# Patient Record
Sex: MALE | Race: BLACK OR AFRICAN AMERICAN | URBAN - METROPOLITAN AREA
[De-identification: names, ages, dates, MRNs, and addresses within clinical notes are randomized per-mention and may not be internally consistent; named-entity substitution may affect disease eponyms.]

---

## 2024-01-27 ENCOUNTER — HOSPITAL ENCOUNTER (EMERGENCY)
Facility: HOSPITAL | Age: 29
Discharge: HOME | End: 2024-01-27
Attending: EMERGENCY MEDICINE
Payer: MEDICAID

## 2024-01-27 VITALS
SYSTOLIC BLOOD PRESSURE: 129 MMHG | TEMPERATURE: 98 F | OXYGEN SATURATION: 96 % | HEART RATE: 102 BPM | WEIGHT: 198 LBS | DIASTOLIC BLOOD PRESSURE: 79 MMHG | BODY MASS INDEX: 25.41 KG/M2 | HEIGHT: 74 IN | RESPIRATION RATE: 18 BRPM

## 2024-01-27 DIAGNOSIS — K08.89 PAIN, DENTAL: Primary | ICD-10-CM

## 2024-01-27 LAB — GLUCOSE BLD-MCNC: 76 MG/DL (ref 70–99)

## 2024-01-27 PROCEDURE — 99283 EMERGENCY DEPT VISIT LOW MDM: CPT

## 2024-01-27 PROCEDURE — 82962 GLUCOSE BLOOD TEST: CPT

## 2024-01-27 RX ORDER — AMOXICILLIN 875 MG/1
875 TABLET, COATED ORAL ONCE
Status: COMPLETED | OUTPATIENT
Start: 2024-01-27 | End: 2024-01-27

## 2024-01-27 RX ORDER — AMOXICILLIN 875 MG/1
875 TABLET, COATED ORAL 2 TIMES DAILY
Qty: 20 TABLET | Refills: 0 | Status: SHIPPED | OUTPATIENT
Start: 2024-01-27 | End: 2024-02-06

## 2024-01-27 RX ORDER — QUETIAPINE FUMARATE 200 MG/1
200 TABLET, FILM COATED ORAL NIGHTLY
COMMUNITY

## 2024-01-27 RX ORDER — IBUPROFEN 200 MG
400 TABLET ORAL ONCE
Status: COMPLETED | OUTPATIENT
Start: 2024-01-27 | End: 2024-01-27

## 2024-01-27 RX ORDER — TRAZODONE HYDROCHLORIDE 100 MG/1
100 TABLET ORAL NIGHTLY
COMMUNITY

## 2024-01-27 RX ORDER — HYDROCODONE BITARTRATE AND ACETAMINOPHEN 5; 325 MG/1; MG/1
TABLET ORAL
Status: DISCONTINUED
Start: 2024-01-27 | End: 2024-01-27

## 2024-01-27 RX ORDER — HYDROCODONE BITARTRATE AND ACETAMINOPHEN 5; 325 MG/1; MG/1
2 TABLET ORAL ONCE
Status: COMPLETED | OUTPATIENT
Start: 2024-01-27 | End: 2024-01-27

## 2024-01-27 RX ORDER — BUSPIRONE HYDROCHLORIDE 5 MG/1
5 TABLET ORAL 2 TIMES DAILY
COMMUNITY

## 2024-01-27 RX ORDER — HYDROCODONE BITARTRATE AND ACETAMINOPHEN 5; 325 MG/1; MG/1
1-2 TABLET ORAL EVERY 6 HOURS PRN
Qty: 10 TABLET | Refills: 0 | Status: SHIPPED | OUTPATIENT
Start: 2024-01-27 | End: 2024-02-01

## 2024-01-27 RX ORDER — LISINOPRIL 10 MG/1
10 TABLET ORAL DAILY
COMMUNITY

## 2024-01-27 NOTE — DISCHARGE INSTRUCTIONS
Avoid foods and liquids that are excessively hot or cold.    You may use ibuprofen 400 mg 2-3 times a day for pain.    Hydrocodone may cause drowsiness and constipation.    Follow-up with your dentist as previously arranged on February 1.

## 2024-01-27 NOTE — ED PROVIDER NOTES
Patient Seen in: [facility] Emergency Department      History     Chief Complaint   Patient presents with    Dental Problem     Stated Complaint: \"I have a really big hole in my tooth, and I have a dentist appt on Tuesday\"    Subjective:   HPI    28-year-old male presents to the emergency department complaining of left upper tooth pain.  The patient stated he chipped his tooth a couple of days ago.  He has an appointment to see his dentist in 3 days but developed pain to the area overnight.  Some soft tissue swelling.  He is a type 1 diabetic.  Blood sugars have been in the 100-200 range.  He is unaware of any renal dysfunction.    Objective:   Past Medical History:   Diagnosis Date    Essential hypertension     Hyperlipidemia     Type 1 diabetes mellitus (HCC)               History reviewed. No pertinent surgical history.             Social History     Socioeconomic History    Marital status: Single   Tobacco Use    Smoking status: Former     Types: Cigarettes     Quit date: 2023     Years since quittin.0    Smokeless tobacco: Never   Vaping Use    Vaping Use: Never used   Substance and Sexual Activity    Alcohol use: Yes     Comment: socially    Drug use: Yes     Types: Cannabis              Review of Systems    Positive for stated complaint: \"I have a really big hole in my tooth, and I have a dentist appt on Tuesday\"  Other systems are as noted in HPI.  Constitutional and vital signs reviewed.      All other systems reviewed and negative except as noted above.    Physical Exam     ED Triage Vitals   BP 24 1443 129/79   Pulse 24 1443 102   Resp 24 1443 18   Temp 24 1442 97.9 °F (36.6 °C)   Temp src 24 1442 Temporal   SpO2 24 1442 96 %   O2 Device 24 1442 None (Room air)       Current:/79   Pulse 102   Temp 97.9 °F (36.6 °C) (Temporal)   Resp 18   Ht 188 cm (6' 2\")   Wt 89.8 kg   SpO2 96%   BMI 25.42 kg/m²         Physical Exam    General  appearance: This is a young adult male sitting on a gurney.  Vital signs were reviewed per nurses notes.  Patient is afebrile.  HEENT: Normocephalic atraumatic.  Anicteric sclera.  Oral mucosa is moist.  Oropharynx is normal.  Left upper first molar has a small fracture noted on the posterior aspect.  There is also a partially erupted left upper third molar.  No gingival fluctuance.  No sublingual or submandibular soft tissue swelling.    ED Course     Labs Reviewed   POCT GLUCOSE - Normal             Blood sugar was normal.    Exam findings and treatment plan were discussed.  Initial doses of amoxicillin and Norco were administered in the emergency department.         MDM      #1.  Dental fracture.  No obvious abscess.  Temperature restrictions discussed with food and fluids.  Antibiotics and analgesics provided.  Patient has dental follow-up in 3 days.                                   MDM    Disposition and Plan     Clinical Impression:  1. Pain, dental         Disposition:  Discharge  1/27/2024  3:28 pm    Follow-up:  Daniel Braswell MD  [address] [facility] [address]  [phone]    Call  As needed          Medications Prescribed:  Discharge Medication List as of 1/27/2024  4:05 PM        START taking these medications    Details   amoxicillin 875 MG Oral Tab Take 1 tablet (875 mg total) by mouth 2 (two) times daily for 10 days., Normal, Disp-20 tablet, R-0      HYDROcodone-acetaminophen 5-325 MG Oral Tab Take 1-2 tablets by mouth every 6 (six) hours as needed for Pain., Normal, Disp-10 tablet, R-0

## 2024-01-27 NOTE — ED INITIAL ASSESSMENT (HPI)
Pt ambulatory to ED w/ c/o \"huge hole to upper tooth on R side\" +swelling to gums. Dentist apt scheduled for Tuesday. Reports 10/10 pain. Denies fevers.

## 2024-05-23 ENCOUNTER — HOSPITAL ENCOUNTER (EMERGENCY)
Facility: HOSPITAL | Age: 29
Discharge: HOME | End: 2024-05-23
Attending: EMERGENCY MEDICINE

## 2024-05-23 ENCOUNTER — APPOINTMENT (OUTPATIENT)
Dept: GENERAL RADIOLOGY | Facility: HOSPITAL | Age: 29
End: 2024-05-23
Attending: EMERGENCY MEDICINE

## 2024-05-23 VITALS
HEART RATE: 86 BPM | TEMPERATURE: 97 F | DIASTOLIC BLOOD PRESSURE: 86 MMHG | RESPIRATION RATE: 18 BRPM | SYSTOLIC BLOOD PRESSURE: 116 MMHG | OXYGEN SATURATION: 98 %

## 2024-05-23 DIAGNOSIS — M25.562 LEFT KNEE PAIN, UNSPECIFIED CHRONICITY: ICD-10-CM

## 2024-05-23 DIAGNOSIS — Z00.8 ENCOUNTER FOR PSYCHOLOGICAL EVALUATION: Primary | ICD-10-CM

## 2024-05-23 LAB
ALBUMIN SERPL-MCNC: 3.7 G/DL (ref 3.4–5)
ALBUMIN/GLOB SERPL: 1.1 {RATIO} (ref 1–2)
ALP LIVER SERPL-CCNC: 81 U/L
ALT SERPL-CCNC: 37 U/L
AMPHET UR QL SCN: NEGATIVE
ANION GAP SERPL CALC-SCNC: 7 MMOL/L (ref 0–18)
APAP SERPL-MCNC: <2 UG/ML (ref 10–30)
AST SERPL-CCNC: 21 U/L (ref 15–37)
BASOPHILS # BLD AUTO: 0.04 X10(3) UL (ref 0–0.2)
BASOPHILS NFR BLD AUTO: 0.4 %
BENZODIAZ UR QL SCN: NEGATIVE
BILIRUB SERPL-MCNC: 0.6 MG/DL (ref 0.1–2)
BUN BLD-MCNC: 11 MG/DL (ref 9–23)
CALCIUM BLD-MCNC: 8.7 MG/DL (ref 8.5–10.1)
CHLORIDE SERPL-SCNC: 106 MMOL/L (ref 98–112)
CO2 SERPL-SCNC: 25 MMOL/L (ref 21–32)
COCAINE UR QL: NEGATIVE
CREAT BLD-MCNC: 1.02 MG/DL
CREAT UR-SCNC: 496 MG/DL
EGFRCR SERPLBLD CKD-EPI 2021: 104 ML/MIN/1.73M2 (ref 60–?)
EOSINOPHIL # BLD AUTO: 0.08 X10(3) UL (ref 0–0.7)
EOSINOPHIL NFR BLD AUTO: 0.8 %
ERYTHROCYTE [DISTWIDTH] IN BLOOD BY AUTOMATED COUNT: 11.7 %
ETHANOL SERPL-MCNC: <3 MG/DL (ref ?–3)
GLOBULIN PLAS-MCNC: 3.5 G/DL (ref 2.8–4.4)
GLUCOSE BLD-MCNC: 104 MG/DL (ref 70–99)
GLUCOSE BLD-MCNC: 245 MG/DL (ref 70–99)
GLUCOSE BLD-MCNC: 315 MG/DL (ref 70–99)
GLUCOSE BLD-MCNC: 448 MG/DL (ref 70–99)
HCT VFR BLD AUTO: 50 %
HGB BLD-MCNC: 17.9 G/DL
IMM GRANULOCYTES # BLD AUTO: 0.02 X10(3) UL (ref 0–1)
IMM GRANULOCYTES NFR BLD: 0.2 %
LYMPHOCYTES # BLD AUTO: 2.81 X10(3) UL (ref 1–4)
LYMPHOCYTES NFR BLD AUTO: 27.8 %
MCH RBC QN AUTO: 31.2 PG (ref 26–34)
MCHC RBC AUTO-ENTMCNC: 35.8 G/DL (ref 31–37)
MCV RBC AUTO: 87.1 FL
MDMA UR QL SCN: NEGATIVE
MONOCYTES # BLD AUTO: 0.86 X10(3) UL (ref 0.1–1)
MONOCYTES NFR BLD AUTO: 8.5 %
NEUTROPHILS # BLD AUTO: 6.28 X10 (3) UL (ref 1.5–7.7)
NEUTROPHILS # BLD AUTO: 6.28 X10(3) UL (ref 1.5–7.7)
NEUTROPHILS NFR BLD AUTO: 62.3 %
OPIATES UR QL SCN: NEGATIVE
OSMOLALITY SERPL CALC.SUM OF ELEC: 294 MOSM/KG (ref 275–295)
OXYCODONE UR QL SCN: NEGATIVE
PLATELET # BLD AUTO: 264 10(3)UL (ref 150–450)
POTASSIUM SERPL-SCNC: 3.5 MMOL/L (ref 3.5–5.1)
PROT SERPL-MCNC: 7.2 G/DL (ref 6.4–8.2)
RBC # BLD AUTO: 5.74 X10(6)UL
SALICYLATES SERPL-MCNC: 2.5 MG/DL (ref 2.8–20)
SARS-COV-2 RNA RESP QL NAA+PROBE: NOT DETECTED
SODIUM SERPL-SCNC: 138 MMOL/L (ref 136–145)
WBC # BLD AUTO: 10.1 X10(3) UL (ref 4–11)

## 2024-05-23 PROCEDURE — 85025 COMPLETE CBC W/AUTO DIFF WBC: CPT | Performed by: EMERGENCY MEDICINE

## 2024-05-23 PROCEDURE — 99285 EMERGENCY DEPT VISIT HI MDM: CPT

## 2024-05-23 PROCEDURE — 80143 DRUG ASSAY ACETAMINOPHEN: CPT | Performed by: EMERGENCY MEDICINE

## 2024-05-23 PROCEDURE — 36415 COLL VENOUS BLD VENIPUNCTURE: CPT

## 2024-05-23 PROCEDURE — 73562 X-RAY EXAM OF KNEE 3: CPT | Performed by: EMERGENCY MEDICINE

## 2024-05-23 PROCEDURE — 80053 COMPREHEN METABOLIC PANEL: CPT | Performed by: EMERGENCY MEDICINE

## 2024-05-23 PROCEDURE — 80307 DRUG TEST PRSMV CHEM ANLYZR: CPT | Performed by: EMERGENCY MEDICINE

## 2024-05-23 PROCEDURE — 73110 X-RAY EXAM OF WRIST: CPT | Performed by: EMERGENCY MEDICINE

## 2024-05-23 PROCEDURE — 80179 DRUG ASSAY SALICYLATE: CPT | Performed by: EMERGENCY MEDICINE

## 2024-05-23 PROCEDURE — 71101 X-RAY EXAM UNILAT RIBS/CHEST: CPT | Performed by: EMERGENCY MEDICINE

## 2024-05-23 PROCEDURE — 82077 ASSAY SPEC XCP UR&BREATH IA: CPT | Performed by: EMERGENCY MEDICINE

## 2024-05-23 PROCEDURE — 82962 GLUCOSE BLOOD TEST: CPT

## 2024-05-23 PROCEDURE — 99284 EMERGENCY DEPT VISIT MOD MDM: CPT

## 2024-05-23 RX ORDER — ATORVASTATIN CALCIUM 20 MG/1
20 TABLET, FILM COATED ORAL DAILY
COMMUNITY
Start: 2024-04-11

## 2024-05-23 RX ORDER — NICOTINE POLACRILEX 4 MG
30 LOZENGE BUCCAL
Status: DISCONTINUED | OUTPATIENT
Start: 2024-05-23 | End: 2024-05-23

## 2024-05-23 RX ORDER — GABAPENTIN 300 MG/1
300 CAPSULE ORAL 3 TIMES DAILY
COMMUNITY

## 2024-05-23 RX ORDER — INSULIN DEGLUDEC 200 U/ML
100 INJECTION, SOLUTION SUBCUTANEOUS DAILY
Status: DISCONTINUED | OUTPATIENT
Start: 2024-05-23 | End: 2024-05-23

## 2024-05-23 RX ORDER — NICOTINE POLACRILEX 4 MG
15 LOZENGE BUCCAL
Status: DISCONTINUED | OUTPATIENT
Start: 2024-05-23 | End: 2024-05-23

## 2024-05-23 RX ORDER — BUSPIRONE HYDROCHLORIDE 10 MG/1
10 TABLET ORAL 2 TIMES DAILY
COMMUNITY
Start: 2024-04-18

## 2024-05-23 RX ORDER — DEXTROSE MONOHYDRATE 25 G/50ML
50 INJECTION, SOLUTION INTRAVENOUS
Status: DISCONTINUED | OUTPATIENT
Start: 2024-05-23 | End: 2024-05-23

## 2024-05-23 RX ORDER — INSULIN LISPRO 200 [IU]/ML
25 INJECTION, SOLUTION SUBCUTANEOUS 3 TIMES DAILY
COMMUNITY

## 2024-05-23 RX ORDER — INSULIN ASPART 100 [IU]/ML
0.2 INJECTION, SOLUTION INTRAVENOUS; SUBCUTANEOUS ONCE
Status: COMPLETED | OUTPATIENT
Start: 2024-05-23 | End: 2024-05-23

## 2024-05-23 NOTE — ED QUICK NOTES
PT calm, cooperative, properly interacting with staff. PT did not present any aggressive or other concerning type of behavior so far. Safety Sitter present

## 2024-05-23 NOTE — ED QUICK NOTES
Blood Glucose taken prior to PT eating breakfast. Results of 315 informed to Dr. Scott, orders will be placed for long lasting insulin ( as requested by PT due to medications taken a t home). Long lasting insulin to be administered and blood glucose to be repeated before lunch to confirm if more short acting insulin is necessary.

## 2024-05-23 NOTE — ED PROVIDER NOTES
Patient Seen in: [facility] Emergency Department      History     Chief Complaint   Patient presents with    Eval-P     Stated Complaint: Eval P    Subjective:   HPI    Patient states he is here for mental health evaluation.  He has a history of depression, anxiety, bipolar disorder, states he has not had access his meds in 2 weeks.    He feels helpless and hopeless.  Denies any SI HI or psychotic features.    When asked if he would feel okay going home with just a prescription for his meds to  he says \"I know myself\" and says he needs to be evaluated for potential patient psychiatric admission.  He denies any medical complaints.     Objective:   Past Medical History:    Anxiety    Depression    Essential hypertension    Hyperlipidemia    Type 1 diabetes mellitus (HCC)              History reviewed. No pertinent surgical history.             Social History     Socioeconomic History    Marital status: Single   Tobacco Use    Smoking status: Former     Current packs/day: 0.00     Types: Cigarettes     Quit date: 2023     Years since quittin.4    Smokeless tobacco: Never   Vaping Use    Vaping status: Never Used   Substance and Sexual Activity    Alcohol use: Yes     Comment: socially    Drug use: Yes     Types: Cannabis              Review of Systems    Positive for stated complaint: Eval P  Other systems are as noted in HPI.  Constitutional and vital signs reviewed.      All other systems reviewed and negative except as noted above.    Physical Exam     ED Triage Vitals [24 0251]   /88   Pulse 88   Resp 18   Temp 97.1 °F (36.2 °C)   Temp src Temporal   SpO2 98 %   O2 Device None (Room air)       Current Vitals:   Vital Signs  BP: 116/86  Pulse: 86  Resp: 18  Temp: 97.1 °F (36.2 °C)  Temp src: Temporal    Oxygen Therapy  SpO2: 98 %  O2 Device: None (Room air)            Physical Exam    Physical Exam   Constitutional: Awake, alert, well appearing  Head: Normocephalic and atraumatic.    Eyes: Conjunctivae are normal. Pupils are equal, round, and reactive to light.   Neck: Normal range of motion. Neck supple.   Cardiovascular: Normal rate, regular rhythm  Pulmonary/Chest: Normal effort.  No accessory muscle use.  No clubbing, no cyanosis.  Abdominal: Soft. Bowel sounds are normal.   Neurological: Pt is alert and oriented to person, place, and time. no cranial nerve deficits  Skin: Skin is warm and dry.    ED Course     Labs Reviewed   COMP METABOLIC PANEL (14) - Abnormal; Notable for the following components:       Result Value    Glucose 245 (*)     All other components within normal limits   ACETAMINOPHEN (TYLENOL), S - Abnormal; Notable for the following components:    Acetaminophen <2.0 (*)     All other components within normal limits   SALICYLATE, SERUM - Abnormal; Notable for the following components:    Salicylate 2.5 (*)     All other components within normal limits   DRUG SCREEN 8 W/OUT CONFIRMATION, URINE - Abnormal; Notable for the following components:    Cannabinoid Urine Presumed Positive (*)     All other components within normal limits    Narrative:     Results of the Urine Drug Screen should be used only for medical purposes.   POCT GLUCOSE - Abnormal; Notable for the following components:    POC Glucose 448 (*)     All other components within normal limits   POCT GLUCOSE - Abnormal; Notable for the following components:    POC Glucose 315 (*)     All other components within normal limits   POCT GLUCOSE - Abnormal; Notable for the following components:    POC Glucose 104 (*)     All other components within normal limits   CBC W/ DIFFERENTIAL - Abnormal; Notable for the following components:    RBC 5.74 (*)     HGB 17.9 (*)     All other components within normal limits   ETHYL ALCOHOL - Normal   SARS-COV-2 BY PCR (GENEXPERT) - Normal   CBC WITH DIFFERENTIAL WITH PLATELET    Narrative:     The following orders were created for panel order CBC With Differential With  Platelet.  Procedure                               Abnormality         Status                     ---------                               -----------         ------                     CBC W/ DIFFERENTIAL[[id number]]          Abnormal            Final result                 Please view results for these tests on the individual orders.               Blood reviewed, mild hyperglycemia but otherwise labs are okay.  Accu-Cheks ordered.         MDM          Differential diagnoses considered:, Bipolar disorder, suicidal ideation,  acute mental health decompensation need for inpatient psychiatric admission.    -Patient is medically cleared for potential inpatient psychiatric admission  -Nabeel Allen evaluate for this later this morning.  Signed out to oncoming doctor          *Discussion of ongoing management of this patient's care included:  Nabeel Allen liaison regarding need for evaluation  *Comorbidities contributing to the complexity of decision making: n/a  *External charts reviewed: n/a  *Additional sources of history: n/a    Shared decision making was done by: patient, myself.                                     Medical Decision Making      Disposition and Plan     Clinical Impression:  1. Encounter for psychological evaluation    2. Left knee pain, unspecified chronicity         Disposition:  Discharge  5/23/2024 11:44 am    Follow-up:  Darren Meek MD  [address]  [address] [address]  [phone]    Call in 1 day(s)            Medications Prescribed:  Discharge Medication List as of 5/23/2024 11:45 AM

## 2024-05-23 NOTE — CM/SW NOTE
Patient cleared for discharge and is requesting transportation home.    [facility] spoke with Angeles @ [facility], [phone]. Lalo Dunn will be sending a taxi to pick patient up.  Angeles did not have a time frame.  The taxi will call the patient and/or the nursing station for approximate ETA.    Patient discharged and waiting in the lobby.  Transportation information given to patient.  Patient verbalized understanding.
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
## 2024-05-23 NOTE — ED QUICK NOTES
Pt ate sandwich and some chips earlier in the morning. Pt reports he is aware of all his daily medications and his insulin.    Masoud at bedside in order to update medication list.     Pt given ice water per request.

## 2024-05-23 NOTE — ED INITIAL ASSESSMENT (HPI)
Pt arrives via EMS [facility], pt states he is having SI thoughts, no real plan. Has not been taking his medications for anxiety for several weeks. Calm and cooperative. A&O x4

## 2024-05-23 NOTE — ED QUICK NOTES
This nurse was informed by pharmacy staff that the hospital does not carry Levemir ( PT's home medication) and pharmacy staff stated that the equivalent is Insulin Degludec ( To be administered DAILY/Once a day instead of Levemir BID). Dr. Scott informed and he confirms that medication orders can be modified to effectively manage the PT's diabetes

## 2024-05-23 NOTE — DISCHARGE INSTRUCTIONS
PHP/IOP resources:     [facility]- [phone]  [address] [place] [address]    [facility]-  [phone]- IOP Only  [address], [place] [address]    [facility] - [phone]  [address] [place] [address]    Psychiatry resources:     Renée [facility]orton- Online and In person   Psychiatric Nurse Practitioner[place] [phone]    [facility] [phone]    [facility] [phone]    [facility]- Online Only   [place], [place] [phone]

## 2024-05-23 NOTE — ED QUICK NOTES
Report given to BETY Burnett.    Lex aware that pt needs med reconciliation completed and meds ordered.

## 2024-05-23 NOTE — ED QUICK NOTES
Dr. Scott informed about the PT's glucose level, he will place orders for PT's medications after pharmacy finishes the reconciliation process.
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
## 2024-05-23 NOTE — ED QUICK NOTES
Pt awake and alert, skin w/d,resps appear unlabored. Pt ambulatory to restroom with steady gait. Sitter at bedside.

## 2025-04-11 ENCOUNTER — HOSPITAL ENCOUNTER (EMERGENCY)
Facility: HOSPITAL | Age: 30
Discharge: HOME | End: 2025-04-11
Attending: STUDENT IN AN ORGANIZED HEALTH CARE EDUCATION/TRAINING PROGRAM
Payer: MEDICAID

## 2025-04-11 ENCOUNTER — APPOINTMENT (OUTPATIENT)
Dept: GENERAL RADIOLOGY | Facility: HOSPITAL | Age: 30
End: 2025-04-11
Payer: MEDICAID

## 2025-04-11 VITALS
HEART RATE: 105 BPM | WEIGHT: 197 LBS | OXYGEN SATURATION: 100 % | RESPIRATION RATE: 19 BRPM | SYSTOLIC BLOOD PRESSURE: 126 MMHG | HEIGHT: 73 IN | TEMPERATURE: 99 F | BODY MASS INDEX: 26.11 KG/M2 | DIASTOLIC BLOOD PRESSURE: 97 MMHG

## 2025-04-11 DIAGNOSIS — J11.1 INFLUENZA: Primary | ICD-10-CM

## 2025-04-11 LAB
FLUAV + FLUBV RNA SPEC NAA+PROBE: NEGATIVE
FLUAV + FLUBV RNA SPEC NAA+PROBE: POSITIVE
RSV RNA SPEC NAA+PROBE: NEGATIVE
SARS-COV-2 RNA RESP QL NAA+PROBE: NOT DETECTED

## 2025-04-11 PROCEDURE — 99284 EMERGENCY DEPT VISIT MOD MDM: CPT

## 2025-04-11 PROCEDURE — 0241U SARS-COV-2/FLU A AND B/RSV BY PCR (GENEXPERT): CPT | Performed by: STUDENT IN AN ORGANIZED HEALTH CARE EDUCATION/TRAINING PROGRAM

## 2025-04-11 PROCEDURE — 94640 AIRWAY INHALATION TREATMENT: CPT

## 2025-04-11 PROCEDURE — 71046 X-RAY EXAM CHEST 2 VIEWS: CPT | Performed by: STUDENT IN AN ORGANIZED HEALTH CARE EDUCATION/TRAINING PROGRAM

## 2025-04-11 RX ORDER — IBUPROFEN 600 MG/1
TABLET, FILM COATED ORAL
Status: COMPLETED
Start: 2025-04-11 | End: 2025-04-11

## 2025-04-11 RX ORDER — IBUPROFEN 600 MG/1
600 TABLET, FILM COATED ORAL EVERY 8 HOURS PRN
Qty: 30 TABLET | Refills: 0 | Status: SHIPPED | OUTPATIENT
Start: 2025-04-11 | End: 2025-04-18

## 2025-04-11 RX ORDER — ACETAMINOPHEN 500 MG
1000 TABLET ORAL ONCE
Status: COMPLETED | OUTPATIENT
Start: 2025-04-11 | End: 2025-04-11

## 2025-04-11 RX ORDER — BENZONATATE 100 MG/1
100 CAPSULE ORAL 3 TIMES DAILY PRN
Qty: 30 CAPSULE | Refills: 0 | Status: SHIPPED | OUTPATIENT
Start: 2025-04-11 | End: 2025-05-11

## 2025-04-11 RX ORDER — ALBUTEROL SULFATE 90 UG/1
2 INHALANT RESPIRATORY (INHALATION) ONCE
Status: COMPLETED | OUTPATIENT
Start: 2025-04-11 | End: 2025-04-11

## 2025-04-11 RX ORDER — ACETAMINOPHEN 500 MG
TABLET ORAL
Status: DISCONTINUED
Start: 2025-04-11 | End: 2025-04-11

## 2025-04-11 RX ORDER — IBUPROFEN 600 MG/1
600 TABLET, FILM COATED ORAL ONCE
Status: COMPLETED | OUTPATIENT
Start: 2025-04-11 | End: 2025-04-11

## 2025-04-11 NOTE — ED QUICK NOTES
Patient is AOX4, multitude of questions were answered within this writer's scope of practice, patient endorses taking several otc medications to manage symptoms without relief

## 2025-04-11 NOTE — DISCHARGE INSTRUCTIONS
I suggest you buy a Kay pot from the pharmacy and irrigate your sinuses in the evening, pat dry then administer 2 puffs of Flonase. Do this for the next 1 week.

## 2025-04-11 NOTE — ED INITIAL ASSESSMENT (HPI)
Pt here due to a cough that has been going on for the past five days. Pt states that his son was sick first and now him and his wife are sick. Pt's wife is also being seen in the ER at this time for the same thing. Pt states that he is holding in the cough because it hurts to much to cough. When he does cough he states that the pain is in the upper part of his chest in the middle.

## 2025-04-11 NOTE — ED PROVIDER NOTES
History     Chief Complaint   Patient presents with    Cough/URI       HPI    29 year old male here with about 5 days of cough, congestion, rhinorrhea, sore throat.  Reports patient's son was initially sick and now him and his wife of the same symptoms results with patient in the ER.  Occasional smoker, no prior history of asthma.  No fevers or vomiting.          Past Medical History[1]    Past Surgical History[2]    Social History     Socioeconomic History    Marital status: Single   Tobacco Use    Smoking status: Former     Current packs/day: 0.00     Types: Cigarettes     Quit date: 2023     Years since quittin.2    Smokeless tobacco: Never   Vaping Use    Vaping status: Never Used   Substance and Sexual Activity    Alcohol use: Yes     Comment: socially    Drug use: Yes     Types: Cannabis                   Physical Exam     ED Triage Vitals [25 1103]   BP (!) 150/96   Pulse 103   Resp 22   Temp 99 °F (37.2 °C)   Temp src Temporal   SpO2 97 %   O2 Device None (Room air)       Physical Exam  Constitutional:       Appearance: He is not ill-appearing.   HENT:      Nose: Congestion and rhinorrhea present.   Eyes:      Extraocular Movements: Extraocular movements intact.   Cardiovascular:      Rate and Rhythm: Normal rate.      Pulses: Normal pulses.   Pulmonary:      Effort: Pulmonary effort is normal. No respiratory distress.      Comments: Scattered wheezes  Musculoskeletal:      Cervical back: Normal range of motion.   Neurological:      General: No focal deficit present.      Mental Status: He is alert.              ED Course     Labs Reviewed   SARS-COV-2/FLU A AND B/RSV BY PCR (GENEXPERT) - Abnormal; Notable for the following components:       Result Value    Influenza B by PCR Positive (*)     All other components within normal limits    Narrative:     This test is intended for the qualitative detection and differentiation of SARS-CoV-2, influenza A, influenza B, and respiratory syncytial  virus (RSV) viral RNA in nasopharyngeal or nares swabs from individuals suspected of respiratory viral infection consistent with COVID-19 by their healthcare provider. Signs and symptoms of respiratory viral infection due to SARS-CoV-2, influenza, and RSV can be similar.    Test performed using the Xpert Xpress SARS-CoV-2/FLU/RSV (real time RT-PCR)  assay on the GeneXpert instrument, [employer], [place] [address].   This test is being used under the Food and Drug Administration's Emergency Use Authorization.    The authorized Fact Sheet for Healthcare Providers for this assay is available upon request from the laboratory.     XR CHEST PA + LAT CHEST (FVO=08809)  Result Date: 4/11/2025  CONCLUSION:   Stable cardiac and mediastinal contours.  Lungs and pleural spaces are clear.  Stable positioning of metallic fragments projecting along the left upper thorax.  No acute osseous findings.   LOCATION:  [facility]   Dictated by (CST): Mandy Landeros MD on 4/11/2025 at 12:01 PM     Finalized by (CST): Mandy Landeros MD on 4/11/2025 at 12:02 PM             MDM     Vitals:    04/11/25 1103   BP: (!) 150/96   Pulse: 103   Resp: 22   Temp: 99 °F (37.2 °C)   TempSrc: Temporal   SpO2: 97%   Weight: 89.4 kg   Height: 185.4 cm (6' 1\")       Viral upper respiratory infection, bronchitis, pneumonia on differential.  Moving air well but has scattered wheezes.  Will trial albuterol.  Antipyretics.  Out of window for oseltamivir.    ED Course as of 04/11/25 1248  ------------------------------------------------------------  Time: 04/11 1135  Comment: My interpretation of chest x-ray without consolidations  ------------------------------------------------------------  Time: 04/11 1247  Comment: Flu positive.  Good response to medicines.  Patient given inhaler and spacer to use at home as needed.  Will prescribe cough medicine and NSAIDs.  Discussed continue supportive measures and PCP follow-up.  Return precautions.         Disposition and Plan      Clinical Impression:  1. Influenza        Disposition:  Discharge    Follow-up:  pcp    Follow up  As needed      Medications Prescribed:  Current Discharge Medication List        START taking these medications    Details   benzonatate 100 MG Oral Cap Take 1 capsule (100 mg total) by mouth 3 (three) times daily as needed for cough.  Qty: 30 capsule, Refills: 0      ibuprofen 600 MG Oral Tab Take 1 tablet (600 mg total) by mouth every 8 (eight) hours as needed for Pain or Fever.  Qty: 30 tablet, Refills: 0                      [1]   Past Medical History:   Anxiety    Bipolar affective (HCC)    Depression    Essential hypertension    Hyperlipidemia    Type 1 diabetes mellitus (HCC)   [2] History reviewed. No pertinent surgical history.

## 2025-04-15 ENCOUNTER — APPOINTMENT (OUTPATIENT)
Dept: CT IMAGING | Facility: HOSPITAL | Age: 30
End: 2025-04-15
Attending: EMERGENCY MEDICINE
Payer: MEDICAID

## 2025-04-15 ENCOUNTER — APPOINTMENT (OUTPATIENT)
Dept: GENERAL RADIOLOGY | Facility: HOSPITAL | Age: 30
End: 2025-04-15
Payer: MEDICAID

## 2025-04-15 ENCOUNTER — HOSPITAL ENCOUNTER (EMERGENCY)
Facility: HOSPITAL | Age: 30
Discharge: HOME | End: 2025-04-15
Attending: EMERGENCY MEDICINE
Payer: MEDICAID

## 2025-04-15 VITALS
DIASTOLIC BLOOD PRESSURE: 84 MMHG | SYSTOLIC BLOOD PRESSURE: 140 MMHG | TEMPERATURE: 98 F | HEART RATE: 82 BPM | RESPIRATION RATE: 18 BRPM | OXYGEN SATURATION: 100 %

## 2025-04-15 DIAGNOSIS — J40 BRONCHITIS: ICD-10-CM

## 2025-04-15 DIAGNOSIS — J11.1 INFLUENZA: Primary | ICD-10-CM

## 2025-04-15 LAB
ALBUMIN SERPL-MCNC: 4.9 G/DL (ref 3.2–4.8)
ALBUMIN/GLOB SERPL: 1.5 {RATIO} (ref 1–2)
ALP LIVER SERPL-CCNC: 79 U/L (ref 45–117)
ALT SERPL-CCNC: 12 U/L (ref 10–49)
ANION GAP SERPL CALC-SCNC: 14 MMOL/L (ref 0–18)
AST SERPL-CCNC: 17 U/L (ref ?–34)
BASOPHILS # BLD AUTO: 0.01 X10(3) UL (ref 0–0.2)
BASOPHILS NFR BLD AUTO: 0.1 %
BILIRUB SERPL-MCNC: 0.4 MG/DL (ref 0.3–1.2)
BUN BLD-MCNC: 13 MG/DL (ref 9–23)
CALCIUM BLD-MCNC: 9.7 MG/DL (ref 8.7–10.6)
CHLORIDE SERPL-SCNC: 94 MMOL/L (ref 98–112)
CO2 SERPL-SCNC: 24 MMOL/L (ref 21–32)
CREAT BLD-MCNC: 1.36 MG/DL (ref 0.7–1.3)
D DIMER PPP FEU-MCNC: 0.34 UG/ML FEU (ref ?–0.5)
EGFRCR SERPLBLD CKD-EPI 2021: 73 ML/MIN/1.73M2 (ref 60–?)
EOSINOPHIL # BLD AUTO: 0.01 X10(3) UL (ref 0–0.7)
EOSINOPHIL NFR BLD AUTO: 0.1 %
ERYTHROCYTE [DISTWIDTH] IN BLOOD BY AUTOMATED COUNT: 11.7 %
GLOBULIN PLAS-MCNC: 3.3 G/DL (ref 2–3.5)
GLUCOSE BLD-MCNC: 287 MG/DL (ref 70–99)
GLUCOSE BLD-MCNC: 421 MG/DL (ref 70–99)
HCT VFR BLD AUTO: 47.4 % (ref 39–53)
HGB BLD-MCNC: 17 G/DL (ref 13–17.5)
IMM GRANULOCYTES # BLD AUTO: 0.02 X10(3) UL (ref 0–1)
IMM GRANULOCYTES NFR BLD: 0.3 %
LYMPHOCYTES # BLD AUTO: 2.27 X10(3) UL (ref 1–4)
LYMPHOCYTES NFR BLD AUTO: 31.4 %
MCH RBC QN AUTO: 30.9 PG (ref 26–34)
MCHC RBC AUTO-ENTMCNC: 35.9 G/DL (ref 31–37)
MCV RBC AUTO: 86.2 FL (ref 80–100)
MONOCYTES # BLD AUTO: 0.75 X10(3) UL (ref 0.1–1)
MONOCYTES NFR BLD AUTO: 10.4 %
NEUTROPHILS # BLD AUTO: 4.16 X10 (3) UL (ref 1.5–7.7)
NEUTROPHILS # BLD AUTO: 4.16 X10(3) UL (ref 1.5–7.7)
NEUTROPHILS NFR BLD AUTO: 57.7 %
OSMOLALITY SERPL CALC.SUM OF ELEC: 292 MOSM/KG (ref 275–295)
PLATELET # BLD AUTO: 283 10(3)UL (ref 150–450)
POTASSIUM SERPL-SCNC: 4.5 MMOL/L (ref 3.5–5.1)
PROT SERPL-MCNC: 8.2 G/DL (ref 5.7–8.2)
RBC # BLD AUTO: 5.5 X10(6)UL (ref 4.3–5.7)
SODIUM SERPL-SCNC: 132 MMOL/L (ref 136–145)
WBC # BLD AUTO: 7.2 X10(3) UL (ref 4–11)

## 2025-04-15 PROCEDURE — 96361 HYDRATE IV INFUSION ADD-ON: CPT

## 2025-04-15 PROCEDURE — 96374 THER/PROPH/DIAG INJ IV PUSH: CPT

## 2025-04-15 PROCEDURE — 80053 COMPREHEN METABOLIC PANEL: CPT | Performed by: EMERGENCY MEDICINE

## 2025-04-15 PROCEDURE — 99285 EMERGENCY DEPT VISIT HI MDM: CPT

## 2025-04-15 PROCEDURE — 85025 COMPLETE CBC W/AUTO DIFF WBC: CPT | Performed by: EMERGENCY MEDICINE

## 2025-04-15 PROCEDURE — 71046 X-RAY EXAM CHEST 2 VIEWS: CPT

## 2025-04-15 PROCEDURE — 85379 FIBRIN DEGRADATION QUANT: CPT | Performed by: EMERGENCY MEDICINE

## 2025-04-15 PROCEDURE — 71260 CT THORAX DX C+: CPT | Performed by: EMERGENCY MEDICINE

## 2025-04-15 PROCEDURE — 82962 GLUCOSE BLOOD TEST: CPT

## 2025-04-15 PROCEDURE — 70491 CT SOFT TISSUE NECK W/DYE: CPT | Performed by: EMERGENCY MEDICINE

## 2025-04-15 RX ORDER — KETOROLAC TROMETHAMINE 15 MG/ML
15 INJECTION, SOLUTION INTRAMUSCULAR; INTRAVENOUS ONCE
Status: COMPLETED | OUTPATIENT
Start: 2025-04-15 | End: 2025-04-15

## 2025-04-15 RX ORDER — CODEINE PHOSPHATE AND GUAIFENESIN 10; 100 MG/5ML; MG/5ML
5 SOLUTION ORAL EVERY 6 HOURS PRN
Qty: 120 ML | Refills: 0 | Status: SHIPPED | OUTPATIENT
Start: 2025-04-15

## 2025-04-15 RX ORDER — INSULIN ASPART 100 [IU]/ML
0.2 INJECTION, SOLUTION INTRAVENOUS; SUBCUTANEOUS ONCE
Status: COMPLETED | OUTPATIENT
Start: 2025-04-15 | End: 2025-04-15

## 2025-04-15 NOTE — ED INITIAL ASSESSMENT (HPI)
Flu B on 4/11. Worsening cough, phlegm changed colors from brown/red to green to milky white. No more fevers. Left sided lung pain/pressure.

## 2025-04-16 NOTE — DISCHARGE INSTRUCTIONS
Over-the-counter cough or cold medications as needed.  Take the prescribed cough medicine as needed for severe coughing this may make you drowsy do not drink or drive while taking it.  Tylenol or Advil for any fever pain drink plenty of fluids.
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
## 2025-04-16 NOTE — CM/SW NOTE
Received a call from BETY Youssef requesting transportation assistance for patient to go home to [address] [place] [address]. Patient has no money and nobody can pick him up. He came by ambulance.    Courtesy [employer] ride arranged, R_ETA 8 minutes, Gray Ronald Masterson ([id number]) driven by [employer]. Ride details given to BETY Geller who's covering for BETY Youssef.

## 2025-07-05 ENCOUNTER — APPOINTMENT (OUTPATIENT)
Dept: GENERAL RADIOLOGY | Facility: HOSPITAL | Age: 30
End: 2025-07-05
Attending: EMERGENCY MEDICINE
Payer: MEDICAID

## 2025-07-05 ENCOUNTER — HOSPITAL ENCOUNTER (EMERGENCY)
Facility: HOSPITAL | Age: 30
Discharge: HOME | End: 2025-07-05
Attending: EMERGENCY MEDICINE
Payer: MEDICAID

## 2025-07-05 VITALS
OXYGEN SATURATION: 100 % | SYSTOLIC BLOOD PRESSURE: 149 MMHG | RESPIRATION RATE: 18 BRPM | WEIGHT: 185 LBS | DIASTOLIC BLOOD PRESSURE: 90 MMHG | HEART RATE: 87 BPM | TEMPERATURE: 97 F | BODY MASS INDEX: 24 KG/M2

## 2025-07-05 DIAGNOSIS — S60.221A CONTUSION OF RIGHT HAND, INITIAL ENCOUNTER: Primary | ICD-10-CM

## 2025-07-05 PROCEDURE — 73110 X-RAY EXAM OF WRIST: CPT | Performed by: EMERGENCY MEDICINE

## 2025-07-05 PROCEDURE — 99283 EMERGENCY DEPT VISIT LOW MDM: CPT

## 2025-07-05 PROCEDURE — 99284 EMERGENCY DEPT VISIT MOD MDM: CPT

## 2025-07-05 PROCEDURE — 73130 X-RAY EXAM OF HAND: CPT | Performed by: EMERGENCY MEDICINE
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
## 2025-07-13 ENCOUNTER — APPOINTMENT (OUTPATIENT)
Dept: CT IMAGING | Age: 30
DRG: 092 | End: 2025-07-13
Attending: OTOLARYNGOLOGY

## 2025-07-13 ENCOUNTER — EXTERNAL LAB (OUTPATIENT)
Dept: HEALTH INFORMATION MANAGEMENT | Facility: OTHER | Age: 30
End: 2025-07-13

## 2025-07-13 ENCOUNTER — IMAGING SERVICES (OUTPATIENT)
Dept: OTHER | Age: 30
End: 2025-07-13

## 2025-07-13 ENCOUNTER — HOSPITAL ENCOUNTER (INPATIENT)
Age: 30
DRG: 092 | End: 2025-07-13
Attending: INTERNAL MEDICINE | Admitting: STUDENT IN AN ORGANIZED HEALTH CARE EDUCATION/TRAINING PROGRAM

## 2025-07-13 VITALS
TEMPERATURE: 99.5 F | HEART RATE: 120 BPM | DIASTOLIC BLOOD PRESSURE: 91 MMHG | HEIGHT: 73 IN | RESPIRATION RATE: 18 BRPM | SYSTOLIC BLOOD PRESSURE: 145 MMHG | WEIGHT: 188.49 LBS | OXYGEN SATURATION: 98 % | BODY MASS INDEX: 24.98 KG/M2

## 2025-07-13 DIAGNOSIS — E11.10 DIABETIC KETOACIDOSIS WITHOUT COMA ASSOCIATED WITH TYPE 2 DIABETES MELLITUS  (CMD): ICD-10-CM
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
## 2025-07-13 LAB
ALBUMIN SERPL-MCNC: 4.2 G/DL (ref 3.4–5)
ALBUMIN SERPL-MCNC: 4.2 G/DL (ref 3.4–5)
ALBUMIN SERPL-MCNC: 4.6 G/DL (ref 3.5–5.7)
ALBUMIN/GLOB SERPL: 1.1 {RATIO} (ref 1–2.4)
ALBUMIN/GLOB SERPL: 1.1 {RATIO} (ref 1–2.4)
ALP SERPL-CCNC: 58 U/L (ref 34–104)
ALP SERPL-CCNC: 71 UNITS/L (ref 45–117)
ALP SERPL-CCNC: 71 UNITS/L (ref 45–117)
ALT SERPL-CCNC: 21 U/L (ref 7–52)
ALT SERPL-CCNC: 22 UNITS/L
ALT SERPL-CCNC: 22 UNITS/L
AMPHETAMINES UR QL SCN>500 NG/ML: NEGATIVE
AMPHETAMINES UR QL SCN>500 NG/ML: NEGATIVE
ANION GAP SERPL CALC-SCNC: 13 MMOL/L (ref 7–19)
ANION GAP SERPL CALC-SCNC: 13 MMOL/L (ref 7–19)
ANION GAP SERPL CALC-SCNC: 19 MMOL/L (ref 7–19)
ANION GAP SERPL CALC-SCNC: 19 MMOL/L (ref 7–19)
ANION GAP SERPL CALC-SCNC: 22 MEQ/L (ref 6–15)
APPEARANCE UR: CLEAR
APPEARANCE UR: CLEAR
AST SERPL-CCNC: 29 U/L (ref 13–39)
AST SERPL-CCNC: 39 UNITS/L
AST SERPL-CCNC: 39 UNITS/L
ATRIAL RATE (BPM): 123
B-OH-BUTYR SERPL-SCNC: 6.4 MMOL/L (ref 0–0.3)
B-OH-BUTYR SERPL-SCNC: 6.4 MMOL/L (ref 0–0.3)
BACTERIA #/AREA URNS HPF: ABNORMAL /HPF
BACTERIA #/AREA URNS HPF: ABNORMAL /HPF
BARBITURATES UR QL SCN>200 NG/ML: NEGATIVE
BARBITURATES UR QL SCN>200 NG/ML: NEGATIVE
BASE EXCESS / DEFICIT, VENOUS - RESPIRATORY: -14 MMOL/L (ref -2–2)
BASE EXCESS / DEFICIT, VENOUS - RESPIRATORY: -14 MMOL/L (ref -2–2)
BASE EXCESS / DEFICIT, VENOUS - RESPIRATORY: -8 MMOL/L (ref -2–2)
BASE EXCESS / DEFICIT, VENOUS - RESPIRATORY: -8 MMOL/L (ref -2–2)
BASOPHILS # BLD: 0.1 K/CMM (ref 0–0.2)
BASOPHILS # BLD: 0.1 K/MCL (ref 0–0.3)
BASOPHILS NFR BLD: 0 %
BDY SITE: ABNORMAL
BENZODIAZ UR QL SCN>200 NG/ML: NEGATIVE
BENZODIAZ UR QL SCN>200 NG/ML: NEGATIVE
BILIRUB SERPL-MCNC: 1 MG/DL (ref 0.2–1)
BILIRUB SERPL-MCNC: 1 MG/DL (ref 0.2–1)
BILIRUB SERPL-MCNC: 1.3 MG/DL (ref 0.2–0.8)
BILIRUB UR QL STRIP: NEGATIVE
BILIRUB UR QL STRIP: NEGATIVE
BODY TEMPERATURE: 37 DEGREES C
BUN SERPL-MCNC: 13 MG/DL (ref 6–20)
BUN SERPL-MCNC: 13 MG/DL (ref 6–20)
BUN SERPL-MCNC: 16 MG/DL (ref 6–20)
BUN SERPL-MCNC: 16 MG/DL (ref 6–20)
BUN SERPL-MCNC: 17 MG/DL (ref 6–26)
BUN/CREAT SERPL: 12 (ref 7–25)
BUN/CREAT SERPL: 12 (ref 7–25)
BUN/CREAT SERPL: 13 (ref 7–25)
BUN/CREAT SERPL: 13 (ref 7–25)
BZE UR QL SCN>150 NG/ML: POSITIVE
BZE UR QL SCN>150 NG/ML: POSITIVE
CA-I ADJ PH7.4 SERPL-SCNC: 1.09 MMOL/L (ref 1.15–1.29)
CA-I ADJ PH7.4 SERPL-SCNC: 1.09 MMOL/L (ref 1.15–1.29)
CA-I BLD-SCNC: 1.15 MMOL/L (ref 1.15–1.29)
CA-I BLD-SCNC: 1.15 MMOL/L (ref 1.15–1.29)
CA-I BLD-SCNC: 1.18 MMOL/L (ref 1.15–1.29)
CA-I BLD-SCNC: 1.18 MMOL/L (ref 1.15–1.29)
CA-I SERPL ISE-SCNC: 1.17 MMOL/L (ref 1.15–1.29)
CA-I SERPL ISE-SCNC: 1.17 MMOL/L (ref 1.15–1.29)
CALCIUM SERPL-MCNC: 8.3 MG/DL (ref 8.4–10.2)
CALCIUM SERPL-MCNC: 8.3 MG/DL (ref 8.4–10.2)
CALCIUM SERPL-MCNC: 8.9 MG/DL (ref 8.4–10.2)
CALCIUM SERPL-MCNC: 8.9 MG/DL (ref 8.4–10.2)
CALCIUM SERPL-MCNC: 9.5 MG/DL (ref 8.6–10.3)
CANNABINOIDS UR QL SCN>50 NG/ML: POSITIVE
CANNABINOIDS UR QL SCN>50 NG/ML: POSITIVE
CHLORIDE SERPL-SCNC: 109 MMOL/L (ref 97–110)
CHLORIDE SERPL-SCNC: 109 MMOL/L (ref 97–110)
CHLORIDE SERPL-SCNC: 114 MMOL/L (ref 97–110)
CHLORIDE SERPL-SCNC: 114 MMOL/L (ref 97–110)
CHLORIDE SERPL-SCNC: 97 MEQ/L (ref 98–107)
CO2 SERPL-SCNC: 13 MMOL/L (ref 21–32)
CO2 SERPL-SCNC: 13 MMOL/L (ref 21–32)
CO2 SERPL-SCNC: 16 MEQ/L (ref 21–31)
CO2 SERPL-SCNC: 17 MMOL/L (ref 21–32)
CO2 SERPL-SCNC: 17 MMOL/L (ref 21–32)
COHGB MFR BLDV: 1.3 %
COHGB MFR BLDV: 1.3 %
COHGB MFR BLDV: 2.2 %
COHGB MFR BLDV: 2.2 %
COLOR UR: COLORLESS
COLOR UR: COLORLESS
CONDITION: ABNORMAL
CREAT SERPL-MCNC: 1.05 MG/DL (ref 0.67–1.17)
CREAT SERPL-MCNC: 1.05 MG/DL (ref 0.67–1.17)
CREAT SERPL-MCNC: 1.23 MG/DL (ref 0.67–1.17)
CREAT SERPL-MCNC: 1.23 MG/DL (ref 0.67–1.17)
CREAT SERPL-MCNC: 1.6 MG/DL (ref 0.6–1.2)
DEPRECATED RDW RBC: 44.5 FL (ref 39–50)
DEPRECATED RDW RBC: 44.5 FL (ref 39–50)
DEPRECATED RDW RBC: 45.3 FL (ref 39–50)
DEPRECATED RDW RBC: 45.3 FL (ref 39–50)
EGFRCR SERPLBLD CKD-EPI 2021: 82 ML/MIN/{1.73_M2}
EGFRCR SERPLBLD CKD-EPI 2021: 82 ML/MIN/{1.73_M2}
EGFRCR SERPLBLD CKD-EPI 2021: >90 ML/MIN/{1.73_M2}
EGFRCR SERPLBLD CKD-EPI 2021: >90 ML/MIN/{1.73_M2}
EOSINOPHIL # BLD: 0 K/CMM (ref 0–0.5)
EOSINOPHIL # BLD: 0 K/MCL (ref 0–0.5)
EOSINOPHIL NFR BLD: 0 %
ERYTHROCYTE [DISTWIDTH] IN BLOOD: 13.2 % (ref 11–15)
ERYTHROCYTE [DISTWIDTH] IN BLOOD: 13.2 % (ref 11–15)
ERYTHROCYTE [DISTWIDTH] IN BLOOD: 13.5 % (ref 11–15)
ERYTHROCYTE [DISTWIDTH] IN BLOOD: 13.5 % (ref 11–15)
ERYTHROCYTE [DISTWIDTH] IN BLOOD: 14 % (ref 11.9–15.9)
ETHANOL SERPL-MCNC: NORMAL MG/DL
ETHANOL SERPL-MCNC: NORMAL MG/DL
FASTING DURATION TIME PATIENT: ABNORMAL H
FENTANYL UR QL SCN: NEGATIVE
FENTANYL UR QL SCN: NEGATIVE
GFR SERPLBLD SCHWARTZ-ARVRAT: 60 1.73M SQ
GLOBULIN SER-MCNC: 3.8 G/DL (ref 2–4)
GLOBULIN SER-MCNC: 3.8 G/DL (ref 2–4)
GLUCOSE BLDC GLUCOMTR-MCNC: 152 MG/DL (ref 70–99)
GLUCOSE BLDC GLUCOMTR-MCNC: 152 MG/DL (ref 70–99)
GLUCOSE BLDC GLUCOMTR-MCNC: 167 MG/DL (ref 70–99)
GLUCOSE BLDC GLUCOMTR-MCNC: 167 MG/DL (ref 70–99)
GLUCOSE BLDC GLUCOMTR-MCNC: 170 MG/DL (ref 70–99)
GLUCOSE BLDC GLUCOMTR-MCNC: 170 MG/DL (ref 70–99)
GLUCOSE BLDC GLUCOMTR-MCNC: 234 MG/DL (ref 70–99)
GLUCOSE BLDC GLUCOMTR-MCNC: 234 MG/DL (ref 70–99)
GLUCOSE BLDC GLUCOMTR-MCNC: 238 MG/DL (ref 70–99)
GLUCOSE BLDC GLUCOMTR-MCNC: 238 MG/DL (ref 70–99)
GLUCOSE BLDC GLUCOMTR-MCNC: 243 MG/DL (ref 70–99)
GLUCOSE BLDC GLUCOMTR-MCNC: 243 MG/DL (ref 70–99)
GLUCOSE BLDC GLUCOMTR-MCNC: 261 MG/DL (ref 70–99)
GLUCOSE BLDC GLUCOMTR-MCNC: 261 MG/DL (ref 70–99)
GLUCOSE BLDC GLUCOMTR-MCNC: 312 MG/DL (ref 70–99)
GLUCOSE BLDC GLUCOMTR-MCNC: 312 MG/DL (ref 70–99)
GLUCOSE BLDC GLUCOMTR-MCNC: 344 MG/DL (ref 70–99)
GLUCOSE BLDC GLUCOMTR-MCNC: 344 MG/DL (ref 70–99)
GLUCOSE SERPL-MCNC: 182 MG/DL (ref 70–99)
GLUCOSE SERPL-MCNC: 182 MG/DL (ref 70–99)
GLUCOSE SERPL-MCNC: 326 MG/DL (ref 70–99)
GLUCOSE SERPL-MCNC: 326 MG/DL (ref 70–99)
GLUCOSE SERPL-MCNC: 498 MG/DL (ref 70–99)
GLUCOSE UR STRIP-MCNC: >1000 MG/DL
GLUCOSE UR STRIP-MCNC: >1000 MG/DL
HBA1C MFR BLD: 9.2 % (ref 4.5–5.6)
HBA1C MFR BLD: 9.2 % (ref 4.5–5.6)
HCO3 BLDV-SCNC: 14 MMOL/L (ref 22–28)
HCO3 BLDV-SCNC: 14 MMOL/L (ref 22–28)
HCO3 BLDV-SCNC: 19 MMOL/L (ref 22–28)
HCO3 BLDV-SCNC: 19 MMOL/L (ref 22–28)
HCT VFR BLD CALC: 45.3 % (ref 39–51)
HCT VFR BLD CALC: 45.3 % (ref 39–51)
HCT VFR BLD CALC: 49.5 % (ref 38.6–49.2)
HCT VFR BLD CALC: 49.5 % (ref 39–51)
HCT VFR BLD CALC: 49.5 % (ref 39–51)
HGB BLD-MCNC: 14.8 G/DL (ref 13–17)
HGB BLD-MCNC: 14.8 G/DL (ref 13–17)
HGB BLD-MCNC: 15.6 G/DL (ref 13–17)
HGB BLD-MCNC: 15.6 G/DL (ref 13–17)
HGB BLD-MCNC: 16.7 G/DL (ref 13–17)
HGB BLD-MCNC: 16.7 G/DL (ref 13–17)
HGB BLD-MCNC: 17.1 G/DL (ref 13–17)
HGB BLD-MCNC: 17.6 G/DL (ref 13–17)
HGB BLD-MCNC: 17.6 G/DL (ref 13–17)
HGB UR QL STRIP: ABNORMAL
HGB UR QL STRIP: ABNORMAL
HYALINE CASTS #/AREA URNS LPF: ABNORMAL /LPF
HYALINE CASTS #/AREA URNS LPF: ABNORMAL /LPF
IMM GRANULOCYTES # BLD AUTO: 0.1 K/MCL (ref 0–0.2)
IMM GRANULOCYTES # BLD: 0 %
IMM GRANULOCYTES # BLD: 0 %
IMM GRANULOCYTES # BLD: 1 %
IMM GRANULOCYTES # BLD: 1 %
KETONES UR STRIP-MCNC: >80 MG/DL
KETONES UR STRIP-MCNC: >80 MG/DL
LAB RESULT: NORMAL
LACTATE BLDV-SCNC: 1.3 MMOL/L
LACTATE BLDV-SCNC: 1.3 MMOL/L
LACTATE BLDV-SCNC: 1.3 MMOL/L (ref 0–2)
LACTATE BLDV-SCNC: 1.3 MMOL/L (ref 0–2)
LACTATE BLDV-SCNC: 1.4 MMOL/L (ref 0–2)
LACTATE BLDV-SCNC: 1.4 MMOL/L (ref 0–2)
LACTATE BLDV-SCNC: 2.1 MMOL/L
LACTATE BLDV-SCNC: 2.1 MMOL/L
LENGTH OF FAST TIME PATIENT: ABNORMAL H
LEUKOCYTE ESTERASE UR QL STRIP: NEGATIVE
LEUKOCYTE ESTERASE UR QL STRIP: NEGATIVE
LYMPHOCYTES # BLD: 1 K/CMM (ref 0.6–3.4)
LYMPHOCYTES # BLD: 1.7 K/MCL (ref 1–4.8)
LYMPHOCYTES # BLD: 1.7 K/MCL (ref 1–4.8)
LYMPHOCYTES # BLD: 2.5 K/MCL (ref 1–4.8)
LYMPHOCYTES # BLD: 2.5 K/MCL (ref 1–4.8)
LYMPHOCYTES NFR BLD: 10 %
LYMPHOCYTES NFR BLD: 10 %
LYMPHOCYTES NFR BLD: 4 %
LYMPHOCYTES NFR BLD: 6 %
LYMPHOCYTES NFR BLD: 6 %
MAGNESIUM SERPL-MCNC: 2.3 MG/DL (ref 1.7–2.4)
MAGNESIUM SERPL-MCNC: 2.3 MG/DL (ref 1.7–2.4)
MCH RBC QN AUTO: 31.1 PG (ref 26–34)
MCH RBC QN AUTO: 31.1 PG (ref 26–34)
MCH RBC QN AUTO: 31.2 PG (ref 26–34)
MCH RBC QN AUTO: 31.4 PG (ref 26–34)
MCH RBC QN AUTO: 31.4 PG (ref 26–34)
MCHC RBC AUTO-ENTMCNC: 33.7 G/DL (ref 32–36.5)
MCHC RBC AUTO-ENTMCNC: 33.7 G/DL (ref 32–36.5)
MCHC RBC AUTO-ENTMCNC: 34.4 G/DL (ref 32–36.5)
MCHC RBC AUTO-ENTMCNC: 34.4 G/DL (ref 32–36.5)
MCHC RBC AUTO-ENTMCNC: 34.5 G/DL (ref 32.5–35.8)
MCV RBC AUTO: 91 FL (ref 80–100)
MCV RBC AUTO: 91.1 FL (ref 78–100)
MCV RBC AUTO: 91.1 FL (ref 78–100)
MCV RBC AUTO: 92.2 FL (ref 78–100)
MCV RBC AUTO: 92.2 FL (ref 78–100)
METHGB MFR BLDMV: 0.5 %
METHGB MFR BLDMV: 0.5 %
METHGB MFR BLDMV: 1 %
METHGB MFR BLDMV: 1 %
MONOCYTES # BLD: 1.8 K/CMM (ref 0.3–1)
MONOCYTES # BLD: 2.4 K/MCL (ref 0.3–0.9)
MONOCYTES # BLD: 2.4 K/MCL (ref 0.3–0.9)
MONOCYTES # BLD: 2.5 K/MCL (ref 0.3–0.9)
MONOCYTES # BLD: 2.5 K/MCL (ref 0.3–0.9)
MONOCYTES NFR BLD: 10 %
MONOCYTES NFR BLD: 8 %
MRSA DNA SPEC QL NAA+PROBE: NOT DETECTED
MRSA DNA SPEC QL NAA+PROBE: NOT DETECTED
NEUTROPHILS # BLD: 19.4 K/MCL (ref 1.8–7.7)
NEUTROPHILS # BLD: 19.4 K/MCL (ref 1.8–7.7)
NEUTROPHILS # BLD: 20 K/CMM (ref 1.5–7)
NEUTROPHILS # BLD: 21.6 K/MCL (ref 1.8–7.7)
NEUTROPHILS # BLD: 21.6 K/MCL (ref 1.8–7.7)
NEUTROPHILS NFR BLD: 80 %
NEUTROPHILS NFR BLD: 80 %
NEUTROPHILS NFR BLD: 83 %
NEUTROPHILS NFR BLD: 83 %
NEUTROPHILS NFR BLD: 87 %
NITRITE UR QL STRIP: NEGATIVE
NITRITE UR QL STRIP: NEGATIVE
NRBC BLD MANUAL-RTO: 0 /100 WBC
OPIATES UR QL SCN>300 NG/ML: NEGATIVE
OPIATES UR QL SCN>300 NG/ML: NEGATIVE
OXYHGB MFR BLDV: 54.7 % (ref 60–80)
OXYHGB MFR BLDV: 54.7 % (ref 60–80)
OXYHGB MFR BLDV: 73.4 % (ref 60–80)
OXYHGB MFR BLDV: 73.4 % (ref 60–80)
P AXIS (DEGREES): 63
PCO2 BLDV: 41 MM HG (ref 41–54)
PCO2 BLDV: 41 MM HG (ref 41–54)
PCO2 BLDV: 43 MM HG (ref 41–54)
PCO2 BLDV: 43 MM HG (ref 41–54)
PCP UR QL SCN>25 NG/ML: NEGATIVE
PCP UR QL SCN>25 NG/ML: NEGATIVE
PH BLDV: 7.15 UNITS (ref 7.35–7.45)
PH BLDV: 7.15 UNITS (ref 7.35–7.45)
PH BLDV: 7.25 UNITS (ref 7.35–7.45)
PH BLDV: 7.25 UNITS (ref 7.35–7.45)
PH UR STRIP: 5.5 [PH] (ref 5–7)
PH UR STRIP: 5.5 [PH] (ref 5–7)
PHOSPHATE SERPL-MCNC: 3.4 MG/DL (ref 2.4–4.7)
PHOSPHATE SERPL-MCNC: 3.4 MG/DL (ref 2.4–4.7)
PLATELET # BLD AUTO: 315 K/MCL (ref 140–450)
PLATELET # BLD AUTO: 315 K/MCL (ref 140–450)
PLATELET # BLD AUTO: 329 K/MCL (ref 140–450)
PLATELET # BLD AUTO: 329 K/MCL (ref 140–450)
PLATELET # BLD: 332 K/CMM (ref 150–450)
PO2 BLDV: 32 MM HG (ref 35–42)
PO2 BLDV: 32 MM HG (ref 35–42)
PO2 BLDV: 52 MM HG (ref 35–42)
PO2 BLDV: 52 MM HG (ref 35–42)
POTASSIUM BLD-SCNC: 4.3 MMOL/L (ref 3.4–5.1)
POTASSIUM BLD-SCNC: 4.3 MMOL/L (ref 3.4–5.1)
POTASSIUM BLD-SCNC: 6.8 MMOL/L (ref 3.4–5.1)
POTASSIUM BLD-SCNC: 6.8 MMOL/L (ref 3.4–5.1)
POTASSIUM SERPL-SCNC: 4.3 MEQ/L (ref 3.5–5.2)
POTASSIUM SERPL-SCNC: 4.8 MMOL/L (ref 3.4–5.1)
POTASSIUM SERPL-SCNC: 4.8 MMOL/L (ref 3.4–5.1)
POTASSIUM SERPL-SCNC: 5.3 MMOL/L (ref 3.4–5.1)
POTASSIUM SERPL-SCNC: 5.3 MMOL/L (ref 3.4–5.1)
PR-INTERVAL (MSEC): 110
PROT SERPL-MCNC: 8 G/DL (ref 6.4–8.2)
PROT SERPL-MCNC: 8 G/DL (ref 6.4–8.2)
PROT SERPL-MCNC: 8.1 G/DL (ref 6.4–8.9)
PROT UR STRIP-MCNC: ABNORMAL MG/DL
PROT UR STRIP-MCNC: ABNORMAL MG/DL
QRS-INTERVAL (MSEC): 94
QT-INTERVAL (MSEC): 326
QTC: 466
R AXIS (DEGREES): 79
RAINBOW EXTRA TUBES HOLD SPECIMEN: NORMAL
RBC # BLD: 4.97 MIL/MCL (ref 4.5–5.9)
RBC # BLD: 4.97 MIL/MCL (ref 4.5–5.9)
RBC # BLD: 5.37 MIL/MCL (ref 4.5–5.9)
RBC # BLD: 5.37 MIL/MCL (ref 4.5–5.9)
RBC # BLD: 5.47 M/CMM (ref 4.34–5.6)
RBC #/AREA URNS HPF: ABNORMAL /HPF
RBC #/AREA URNS HPF: ABNORMAL /HPF
REPORT TEXT: NORMAL
SAO2 DF BLDV: 56 % (ref 60–80)
SAO2 DF BLDV: 56 % (ref 60–80)
SAO2 DF BLDV: 76 % (ref 60–80)
SAO2 DF BLDV: 76 % (ref 60–80)
SODIUM BLD-SCNC: 135 MMOL/L (ref 135–145)
SODIUM BLD-SCNC: 135 MMOL/L (ref 135–145)
SODIUM BLD-SCNC: 140 MMOL/L (ref 135–145)
SODIUM BLD-SCNC: 140 MMOL/L (ref 135–145)
SODIUM SERPL-SCNC: 135 MEQ/L (ref 133–144)
SODIUM SERPL-SCNC: 136 MMOL/L (ref 135–145)
SODIUM SERPL-SCNC: 136 MMOL/L (ref 135–145)
SODIUM SERPL-SCNC: 139 MMOL/L (ref 135–145)
SODIUM SERPL-SCNC: 139 MMOL/L (ref 135–145)
SP GR UR STRIP: 1.03 (ref 1–1.03)
SP GR UR STRIP: 1.03 (ref 1–1.03)
SQUAMOUS #/AREA URNS HPF: ABNORMAL /HPF
SQUAMOUS #/AREA URNS HPF: ABNORMAL /HPF
T AXIS (DEGREES): 27
UROBILINOGEN UR STRIP-MCNC: 0.2 MG/DL
UROBILINOGEN UR STRIP-MCNC: 0.2 MG/DL
VENTRICULAR RATE EKG/MIN (BPM): 123
WBC # BLD: 22.9 K/CMM (ref 4–11)
WBC # BLD: 24.5 K/MCL (ref 4.2–11)
WBC # BLD: 24.5 K/MCL (ref 4.2–11)
WBC # BLD: 26 K/MCL (ref 4.2–11)
WBC # BLD: 26 K/MCL (ref 4.2–11)
WBC #/AREA URNS HPF: ABNORMAL /HPF
WBC #/AREA URNS HPF: ABNORMAL /HPF

## 2025-07-13 PROCEDURE — 83605 ASSAY OF LACTIC ACID: CPT

## 2025-07-13 PROCEDURE — 96372 THER/PROPH/DIAG INJ SC/IM: CPT | Performed by: STUDENT IN AN ORGANIZED HEALTH CARE EDUCATION/TRAINING PROGRAM

## 2025-07-13 PROCEDURE — 82330 ASSAY OF CALCIUM: CPT

## 2025-07-13 PROCEDURE — 10002807 HB RX 258: Performed by: STUDENT IN AN ORGANIZED HEALTH CARE EDUCATION/TRAINING PROGRAM

## 2025-07-13 PROCEDURE — 10002800 HB RX 250 W HCPCS

## 2025-07-13 PROCEDURE — 84295 ASSAY OF SERUM SODIUM: CPT

## 2025-07-13 PROCEDURE — 10002800 HB RX 250 W HCPCS: Performed by: STUDENT IN AN ORGANIZED HEALTH CARE EDUCATION/TRAINING PROGRAM

## 2025-07-13 PROCEDURE — 10004180 HB COUNTER-TRANSPORT

## 2025-07-13 PROCEDURE — 99284 EMERGENCY DEPT VISIT MOD MDM: CPT

## 2025-07-13 PROCEDURE — 36415 COLL VENOUS BLD VENIPUNCTURE: CPT

## 2025-07-13 PROCEDURE — 82962 GLUCOSE BLOOD TEST: CPT

## 2025-07-13 PROCEDURE — 10005281 CT 3D RENDERING ON SAME WS

## 2025-07-13 PROCEDURE — 85018 HEMOGLOBIN: CPT

## 2025-07-13 PROCEDURE — 10000008 HB ROOM CHARGE ICU OR CCU

## 2025-07-13 PROCEDURE — 85025 COMPLETE CBC W/AUTO DIFF WBC: CPT

## 2025-07-13 PROCEDURE — 84132 ASSAY OF SERUM POTASSIUM: CPT

## 2025-07-13 PROCEDURE — 70486 CT MAXILLOFACIAL W/O DYE: CPT

## 2025-07-13 PROCEDURE — 84100 ASSAY OF PHOSPHORUS: CPT | Performed by: STUDENT IN AN ORGANIZED HEALTH CARE EDUCATION/TRAINING PROGRAM

## 2025-07-13 PROCEDURE — 10002807 HB RX 258

## 2025-07-13 PROCEDURE — 90715 TDAP VACCINE 7 YRS/> IM: CPT

## 2025-07-13 PROCEDURE — 87481 CANDIDA DNA AMP PROBE: CPT

## 2025-07-13 PROCEDURE — 83605 ASSAY OF LACTIC ACID: CPT | Performed by: STUDENT IN AN ORGANIZED HEALTH CARE EDUCATION/TRAINING PROGRAM

## 2025-07-13 PROCEDURE — 83735 ASSAY OF MAGNESIUM: CPT | Performed by: STUDENT IN AN ORGANIZED HEALTH CARE EDUCATION/TRAINING PROGRAM

## 2025-07-13 PROCEDURE — 93005 ELECTROCARDIOGRAM TRACING: CPT

## 2025-07-13 PROCEDURE — 99291 CRITICAL CARE FIRST HOUR: CPT | Performed by: STUDENT IN AN ORGANIZED HEALTH CARE EDUCATION/TRAINING PROGRAM

## 2025-07-13 PROCEDURE — 96372 THER/PROPH/DIAG INJ SC/IM: CPT

## 2025-07-13 PROCEDURE — 82375 ASSAY CARBOXYHB QUANT: CPT

## 2025-07-13 PROCEDURE — 96365 THER/PROPH/DIAG IV INF INIT: CPT

## 2025-07-13 PROCEDURE — 82077 ASSAY SPEC XCP UR&BREATH IA: CPT

## 2025-07-13 PROCEDURE — 10004651 HB RX, NO CHARGE ITEM

## 2025-07-13 PROCEDURE — 80048 BASIC METABOLIC PNL TOTAL CA: CPT | Performed by: STUDENT IN AN ORGANIZED HEALTH CARE EDUCATION/TRAINING PROGRAM

## 2025-07-13 PROCEDURE — 99285 EMERGENCY DEPT VISIT HI MDM: CPT

## 2025-07-13 PROCEDURE — 87641 MR-STAPH DNA AMP PROBE: CPT

## 2025-07-13 PROCEDURE — 82805 BLOOD GASES W/O2 SATURATION: CPT

## 2025-07-13 PROCEDURE — 82010 KETONE BODYS QUAN: CPT

## 2025-07-13 PROCEDURE — 99291 CRITICAL CARE FIRST HOUR: CPT

## 2025-07-13 PROCEDURE — 83036 HEMOGLOBIN GLYCOSYLATED A1C: CPT

## 2025-07-13 PROCEDURE — 81001 URINALYSIS AUTO W/SCOPE: CPT

## 2025-07-13 PROCEDURE — 10003579 HB TRAUMA W/O CRITICAL CARE

## 2025-07-13 PROCEDURE — 96361 HYDRATE IV INFUSION ADD-ON: CPT

## 2025-07-13 PROCEDURE — 80053 COMPREHEN METABOLIC PANEL: CPT

## 2025-07-13 PROCEDURE — A4216 STERILE WATER/SALINE, 10 ML: HCPCS

## 2025-07-13 PROCEDURE — 80307 DRUG TEST PRSMV CHEM ANLYZR: CPT

## 2025-07-13 PROCEDURE — G0390 TRAUMA RESPONS W/HOSP CRITI: HCPCS

## 2025-07-13 RX ORDER — INSULIN GLARGINE 100 [IU]/ML
40 INJECTION, SOLUTION SUBCUTANEOUS NIGHTLY
Status: ON HOLD | COMMUNITY
End: 2025-07-15

## 2025-07-13 RX ORDER — INSULIN GLARGINE 100 [IU]/ML
10 INJECTION, SOLUTION SUBCUTANEOUS EVERY 12 HOURS SCHEDULED
Status: DISCONTINUED | OUTPATIENT
Start: 2025-07-13 | End: 2025-07-14

## 2025-07-13 RX ORDER — ATORVASTATIN CALCIUM 10 MG/1
10 TABLET, FILM COATED ORAL NIGHTLY
Status: ON HOLD | COMMUNITY
End: 2025-07-22 | Stop reason: HOSPADM

## 2025-07-13 RX ORDER — BUSPIRONE HYDROCHLORIDE 10 MG/1
10 TABLET ORAL 2 TIMES DAILY
Status: ON HOLD | COMMUNITY
End: 2025-07-22 | Stop reason: HOSPADM

## 2025-07-13 RX ORDER — SODIUM CHLORIDE 9 MG/ML
INJECTION, SOLUTION INTRAVENOUS CONTINUOUS
Status: DISCONTINUED | OUTPATIENT
Start: 2025-07-13 | End: 2025-07-13

## 2025-07-13 RX ORDER — 0.9 % SODIUM CHLORIDE 0.9 %
2 VIAL (ML) INJECTION EVERY 12 HOURS SCHEDULED
Status: DISCONTINUED | OUTPATIENT
Start: 2025-07-13 | End: 2025-07-22 | Stop reason: HOSPADM

## 2025-07-13 RX ORDER — PRAZOSIN HYDROCHLORIDE 1 MG/1
1 CAPSULE ORAL 3 TIMES DAILY
Status: ON HOLD | COMMUNITY
End: 2025-07-22 | Stop reason: HOSPADM

## 2025-07-13 RX ORDER — DEXTROSE MONOHYDRATE 25 G/50ML
12.5 INJECTION, SOLUTION INTRAVENOUS PRN
Status: DISCONTINUED | OUTPATIENT
Start: 2025-07-13 | End: 2025-07-14

## 2025-07-13 RX ORDER — DEXTROSE MONOHYDRATE 25 G/50ML
25 INJECTION, SOLUTION INTRAVENOUS PRN
Status: DISCONTINUED | OUTPATIENT
Start: 2025-07-13 | End: 2025-07-14

## 2025-07-13 RX ORDER — DEXTROSE MONOHYDRATE 25 G/50ML
12.5 INJECTION, SOLUTION INTRAVENOUS PRN
Status: DISCONTINUED | OUTPATIENT
Start: 2025-07-13 | End: 2025-07-13

## 2025-07-13 RX ORDER — FOLIC ACID 5 MG/ML
1 INJECTION, SOLUTION INTRAMUSCULAR; INTRAVENOUS; SUBCUTANEOUS DAILY
Status: DISCONTINUED | OUTPATIENT
Start: 2025-07-13 | End: 2025-07-17

## 2025-07-13 RX ORDER — SODIUM CHLORIDE 9 MG/ML
INJECTION, SOLUTION INTRAVENOUS CONTINUOUS PRN
Status: DISCONTINUED | OUTPATIENT
Start: 2025-07-13 | End: 2025-07-22 | Stop reason: HOSPADM

## 2025-07-13 RX ORDER — DEXTROSE, SODIUM CHLORIDE, SODIUM LACTATE, POTASSIUM CHLORIDE, AND CALCIUM CHLORIDE 5; .6; .31; .03; .02 G/100ML; G/100ML; G/100ML; G/100ML; G/100ML
INJECTION, SOLUTION INTRAVENOUS CONTINUOUS PRN
Status: DISCONTINUED | OUTPATIENT
Start: 2025-07-13 | End: 2025-07-14

## 2025-07-13 RX ORDER — NICOTINE POLACRILEX 4 MG
30 LOZENGE BUCCAL PRN
Status: DISCONTINUED | OUTPATIENT
Start: 2025-07-13 | End: 2025-07-13

## 2025-07-13 RX ORDER — KETOROLAC TROMETHAMINE 15 MG/ML
15 INJECTION, SOLUTION INTRAMUSCULAR; INTRAVENOUS ONCE
Status: COMPLETED | OUTPATIENT
Start: 2025-07-13 | End: 2025-07-13

## 2025-07-13 RX ORDER — SODIUM CHLORIDE, SODIUM LACTATE, POTASSIUM CHLORIDE, CALCIUM CHLORIDE 600; 310; 30; 20 MG/100ML; MG/100ML; MG/100ML; MG/100ML
INJECTION, SOLUTION INTRAVENOUS CONTINUOUS PRN
Status: DISCONTINUED | OUTPATIENT
Start: 2025-07-13 | End: 2025-07-13

## 2025-07-13 RX ORDER — NICOTINE POLACRILEX 4 MG
15 LOZENGE BUCCAL PRN
Status: DISCONTINUED | OUTPATIENT
Start: 2025-07-13 | End: 2025-07-13

## 2025-07-13 RX ORDER — DEXTROSE MONOHYDRATE 25 G/50ML
25 INJECTION, SOLUTION INTRAVENOUS PRN
Status: DISCONTINUED | OUTPATIENT
Start: 2025-07-13 | End: 2025-07-13

## 2025-07-13 RX ORDER — ONDANSETRON 2 MG/ML
4 INJECTION INTRAMUSCULAR; INTRAVENOUS EVERY 6 HOURS PRN
Status: DISCONTINUED | OUTPATIENT
Start: 2025-07-13 | End: 2025-07-22 | Stop reason: HOSPADM

## 2025-07-13 RX ORDER — SODIUM CHLORIDE, SODIUM LACTATE, POTASSIUM CHLORIDE, CALCIUM CHLORIDE 600; 310; 30; 20 MG/100ML; MG/100ML; MG/100ML; MG/100ML
INJECTION, SOLUTION INTRAVENOUS CONTINUOUS
Status: DISCONTINUED | OUTPATIENT
Start: 2025-07-13 | End: 2025-07-14

## 2025-07-13 RX ORDER — INSULIN LISPRO 100 [IU]/ML
15 INJECTION, SOLUTION INTRAVENOUS; SUBCUTANEOUS
Status: ON HOLD | COMMUNITY
End: 2025-07-15

## 2025-07-13 RX ORDER — SODIUM CHLORIDE, SODIUM LACTATE, POTASSIUM CHLORIDE, CALCIUM CHLORIDE 600; 310; 30; 20 MG/100ML; MG/100ML; MG/100ML; MG/100ML
INJECTION, SOLUTION INTRAVENOUS CONTINUOUS
Status: DISCONTINUED | OUTPATIENT
Start: 2025-07-13 | End: 2025-07-13

## 2025-07-13 RX ORDER — POTASSIUM CHLORIDE 14.9 MG/ML
20 INJECTION INTRAVENOUS PRN
Status: DISCONTINUED | OUTPATIENT
Start: 2025-07-13 | End: 2025-07-14

## 2025-07-13 RX ORDER — MAGNESIUM SULFATE 4 G/50ML
4 INJECTION INTRAVENOUS EVERY 4 HOURS PRN
Status: DISCONTINUED | OUTPATIENT
Start: 2025-07-13 | End: 2025-07-14

## 2025-07-13 RX ORDER — QUETIAPINE FUMARATE 300 MG/1
300 TABLET, FILM COATED ORAL NIGHTLY
Status: ON HOLD | COMMUNITY
End: 2025-07-22 | Stop reason: HOSPADM

## 2025-07-13 RX ORDER — 0.9 % SODIUM CHLORIDE 0.9 %
10 VIAL (ML) INJECTION PRN
Status: DISCONTINUED | OUTPATIENT
Start: 2025-07-13 | End: 2025-07-22 | Stop reason: HOSPADM

## 2025-07-13 RX ORDER — INSULIN LISPRO 100 [IU]/ML
6 INJECTION, SOLUTION INTRAVENOUS; SUBCUTANEOUS ONCE
Status: COMPLETED | OUTPATIENT
Start: 2025-07-13 | End: 2025-07-13

## 2025-07-13 RX ORDER — NICOTINE POLACRILEX 4 MG
30 LOZENGE BUCCAL PRN
Status: DISCONTINUED | OUTPATIENT
Start: 2025-07-13 | End: 2025-07-14

## 2025-07-13 RX ORDER — LITHIUM CARBONATE 300 MG/1
300 CAPSULE ORAL 3 TIMES DAILY
Status: ON HOLD | COMMUNITY
End: 2025-07-22 | Stop reason: HOSPADM

## 2025-07-13 RX ORDER — DEXMEDETOMIDINE HYDROCHLORIDE 4 UG/ML
0-1 INJECTION, SOLUTION INTRAVENOUS CONTINUOUS
Status: DISCONTINUED | OUTPATIENT
Start: 2025-07-13 | End: 2025-07-13

## 2025-07-13 RX ORDER — NICOTINE POLACRILEX 4 MG
15 LOZENGE BUCCAL PRN
Status: DISCONTINUED | OUTPATIENT
Start: 2025-07-13 | End: 2025-07-14

## 2025-07-13 RX ORDER — HEPARIN SODIUM 5000 [USP'U]/ML
5000 INJECTION, SOLUTION INTRAVENOUS; SUBCUTANEOUS EVERY 8 HOURS SCHEDULED
Status: DISCONTINUED | OUTPATIENT
Start: 2025-07-13 | End: 2025-07-20 | Stop reason: ALTCHOICE

## 2025-07-13 RX ORDER — HALOPERIDOL 5 MG/ML
2 INJECTION INTRAMUSCULAR EVERY 6 HOURS PRN
Status: DISCONTINUED | OUTPATIENT
Start: 2025-07-13 | End: 2025-07-22 | Stop reason: HOSPADM

## 2025-07-13 RX ORDER — TRAZODONE HYDROCHLORIDE 100 MG/1
100 TABLET ORAL NIGHTLY
COMMUNITY

## 2025-07-13 RX ORDER — THIAMINE HYDROCHLORIDE 100 MG/ML
100 INJECTION, SOLUTION INTRAMUSCULAR; INTRAVENOUS DAILY
Status: DISCONTINUED | OUTPATIENT
Start: 2025-07-13 | End: 2025-07-17

## 2025-07-13 RX ORDER — LOSARTAN POTASSIUM 25 MG/1
25 TABLET ORAL DAILY
Status: ON HOLD | COMMUNITY
End: 2025-07-22 | Stop reason: HOSPADM

## 2025-07-13 RX ADMIN — SODIUM CHLORIDE, PRESERVATIVE FREE 2 ML: 5 INJECTION INTRAVENOUS at 20:52

## 2025-07-13 RX ADMIN — SODIUM CHLORIDE: 900 INJECTION INTRAVENOUS at 16:40

## 2025-07-13 RX ADMIN — HEPARIN SODIUM 5000 UNITS: 5000 INJECTION INTRAVENOUS; SUBCUTANEOUS at 21:31

## 2025-07-13 RX ADMIN — SODIUM CHLORIDE 1000 ML: 9 INJECTION, SOLUTION INTRAVENOUS at 17:22

## 2025-07-13 RX ADMIN — KETOROLAC TROMETHAMINE 15 MG: 15 INJECTION, SOLUTION INTRAMUSCULAR; INTRAVENOUS at 20:47

## 2025-07-13 RX ADMIN — FOLIC ACID 1 MG: 5 INJECTION, SOLUTION INTRAMUSCULAR; INTRAVENOUS; SUBCUTANEOUS at 21:19

## 2025-07-13 RX ADMIN — SODIUM CHLORIDE, POTASSIUM CHLORIDE, SODIUM LACTATE AND CALCIUM CHLORIDE 500 ML: 600; 310; 30; 20 INJECTION, SOLUTION INTRAVENOUS at 17:48

## 2025-07-13 RX ADMIN — INSULIN HUMAN 2 UNITS/HR: 1 INJECTION, SOLUTION INTRAVENOUS at 17:45

## 2025-07-13 RX ADMIN — PIPERACILLIN AND TAZOBACTAM 3.38 G: 3; .375 INJECTION, POWDER, FOR SOLUTION INTRAVENOUS at 23:46

## 2025-07-13 RX ADMIN — THIAMINE HYDROCHLORIDE 100 MG: 100 INJECTION, SOLUTION INTRAMUSCULAR; INTRAVENOUS at 20:51

## 2025-07-13 RX ADMIN — TETANUS TOXOID, REDUCED DIPHTHERIA TOXOID AND ACELLULAR PERTUSSIS VACCINE, ADSORBED 0.5 ML: 5; 2.5; 8; 8; 2.5 SUSPENSION INTRAMUSCULAR at 16:00

## 2025-07-13 RX ADMIN — INSULIN GLARGINE 10 UNITS: 100 INJECTION, SOLUTION SUBCUTANEOUS at 21:29

## 2025-07-13 RX ADMIN — INSULIN HUMAN 2 UNITS/HR: 1 INJECTION, SOLUTION INTRAVENOUS at 19:30

## 2025-07-13 RX ADMIN — INSULIN LISPRO 6 UNITS: 100 INJECTION, SOLUTION INTRAVENOUS; SUBCUTANEOUS at 15:59

## 2025-07-13 RX ADMIN — INSULIN LISPRO 4 UNITS: 100 INJECTION, SOLUTION INTRAVENOUS; SUBCUTANEOUS at 15:04

## 2025-07-13 RX ADMIN — SODIUM CHLORIDE, POTASSIUM CHLORIDE, SODIUM LACTATE AND CALCIUM CHLORIDE: 600; 310; 30; 20 INJECTION, SOLUTION INTRAVENOUS at 18:35

## 2025-07-13 RX ADMIN — PIPERACILLIN SODIUM AND TAZOBACTAM SODIUM 3.38 G: 3; .375 INJECTION, POWDER, FOR SOLUTION INTRAVENOUS at 15:04

## 2025-07-13 RX ADMIN — SODIUM CHLORIDE, POTASSIUM CHLORIDE, SODIUM LACTATE AND CALCIUM CHLORIDE: 600; 310; 30; 20 INJECTION, SOLUTION INTRAVENOUS at 21:45

## 2025-07-13 RX ADMIN — SODIUM CHLORIDE 1000 ML: 9 INJECTION, SOLUTION INTRAVENOUS at 15:07

## 2025-07-13 SDOH — ECONOMIC STABILITY: FOOD INSECURITY: WITHIN THE PAST 12 MONTHS, THE FOOD YOU BOUGHT JUST DIDN'T LAST AND YOU DIDN'T HAVE MONEY TO GET MORE.: NEVER TRUE

## 2025-07-13 SDOH — HEALTH STABILITY: GENERAL: BECAUSE OF A PHYSICAL, MENTAL, OR EMOTIONAL CONDITION, DO YOU HAVE DIFFICULTY DOING ERRANDS ALONE?: NO

## 2025-07-13 SDOH — HEALTH STABILITY: PHYSICAL HEALTH: DO YOU HAVE SERIOUS DIFFICULTY WALKING OR CLIMBING STAIRS?: NO

## 2025-07-13 SDOH — HEALTH STABILITY: PHYSICAL HEALTH: DO YOU HAVE DIFFICULTY DRESSING OR BATHING?: NO

## 2025-07-13 SDOH — SOCIAL STABILITY: SOCIAL INSECURITY: HOW OFTEN DOES ANYONE, INCLUDING FAMILY AND FRIENDS, SCREAM OR CURSE AT YOU?: PATIENT DECLINED

## 2025-07-13 SDOH — SOCIAL STABILITY: SOCIAL INSECURITY: HOW OFTEN DOES ANYONE, INCLUDING FAMILY AND FRIENDS, INSULT OR TALK DOWN TO YOU?: PATIENT DECLINED

## 2025-07-13 SDOH — ECONOMIC STABILITY: HOUSING INSECURITY: WHAT IS YOUR LIVING SITUATION TODAY?: I HAVE A STEADY PLACE TO LIVE

## 2025-07-13 SDOH — ECONOMIC STABILITY: INCOME INSECURITY: IN THE PAST 12 MONTHS, HAS THE ELECTRIC, GAS, OIL, OR WATER COMPANY THREATENED TO SHUT OFF SERVICE IN YOUR HOME?: NO

## 2025-07-13 SDOH — ECONOMIC STABILITY: GENERAL

## 2025-07-13 SDOH — ECONOMIC STABILITY: TRANSPORTATION INSECURITY
IN THE PAST 12 MONTHS, HAS LACK OF RELIABLE TRANSPORTATION KEPT YOU FROM MEDICAL APPOINTMENTS, MEETINGS, WORK OR FROM GETTING THINGS NEEDED FOR DAILY LIVING?: NO

## 2025-07-13 SDOH — SOCIAL STABILITY: SOCIAL NETWORK
HOW OFTEN DO YOU SEE OR TALK TO PEOPLE THAT YOU CARE ABOUT AND FEEL CLOSE TO? (FOR EXAMPLE: TALKING TO FRIENDS ON THE PHONE, VISITING FRIENDS OR FAMILY, GOING TO CHURCH OR CLUB MEETINGS): 1 OR 2 TIMES A WEEK

## 2025-07-13 SDOH — HEALTH STABILITY: GENERAL
BECAUSE OF A PHYSICAL, MENTAL, OR EMOTIONAL CONDITION, DO YOU HAVE SERIOUS DIFFICULTY CONCENTRATING, REMEMBERING OR MAKING DECISIONS?: YES

## 2025-07-13 SDOH — ECONOMIC STABILITY: HOUSING INSECURITY: WHAT IS YOUR LIVING SITUATION TODAY?: APARTMENT

## 2025-07-13 SDOH — SOCIAL STABILITY: SOCIAL INSECURITY: HOW OFTEN DOES ANYONE, INCLUDING FAMILY AND FRIENDS, PHYSICALLY HURT YOU?: PATIENT DECLINED

## 2025-07-13 SDOH — SOCIAL STABILITY: SOCIAL INSECURITY: HOW OFTEN DOES ANYONE, INCLUDING FAMILY AND FRIENDS, THREATEN YOU WITH HARM?: PATIENT DECLINED

## 2025-07-13 SDOH — ECONOMIC STABILITY: HOUSING INSECURITY: DO YOU HAVE PROBLEMS WITH ANY OF THE FOLLOWING?: PATIENT DECLINED

## 2025-07-13 SDOH — ECONOMIC STABILITY: HOUSING INSECURITY: WHAT IS YOUR LIVING SITUATION TODAY?: SPOUSE

## 2025-07-13 SDOH — SOCIAL STABILITY: SOCIAL NETWORK: SUPPORT SYSTEMS: FAMILY MEMBERS

## 2025-07-13 ASSESSMENT — ACTIVITIES OF DAILY LIVING (ADL)
RECENT_DECLINE_ADL: NO
ADL_SCORE: 12
ADL_BEFORE_ADMISSION: INDEPENDENT
ADL_SHORT_OF_BREATH: NO

## 2025-07-13 ASSESSMENT — ENCOUNTER SYMPTOMS
CHEST TIGHTNESS: 0
DIZZINESS: 0
CONSTIPATION: 0
ABDOMINAL DISTENTION: 0
DIARRHEA: 0
SHORTNESS OF BREATH: 0

## 2025-07-13 ASSESSMENT — PAIN SCALES - GENERAL
PAINLEVEL_OUTOF10: 0
PAINLEVEL_OUTOF10: 0
PAINLEVEL_OUTOF10: 10

## 2025-07-13 ASSESSMENT — PATIENT HEALTH QUESTIONNAIRE - PHQ9
SUM OF ALL RESPONSES TO PHQ9 QUESTIONS 1 AND 2: 0
IS PATIENT ABLE TO COMPLETE PHQ2 OR PHQ9: YES
2. FEELING DOWN, DEPRESSED OR HOPELESS: NOT AT ALL
1. LITTLE INTEREST OR PLEASURE IN DOING THINGS: NOT AT ALL
SUM OF ALL RESPONSES TO PHQ9 QUESTIONS 1 AND 2: 0
CLINICAL INTERPRETATION OF PHQ2 SCORE: NO FURTHER SCREENING NEEDED

## 2025-07-13 ASSESSMENT — COLUMBIA-SUICIDE SEVERITY RATING SCALE - C-SSRS
IS THE PATIENT ABLE TO COMPLETE C-SSRS: YES
1. WITHIN THE PAST MONTH, HAVE YOU WISHED YOU WERE DEAD OR WISHED YOU COULD GO TO SLEEP AND NOT WAKE UP?: NO
6. HAVE YOU EVER DONE ANYTHING, STARTED TO DO ANYTHING, OR PREPARED TO DO ANYTHING TO END YOUR LIFE?: NO
2. HAVE YOU ACTUALLY HAD ANY THOUGHTS OF KILLING YOURSELF?: NO

## 2025-07-14 ENCOUNTER — APPOINTMENT (OUTPATIENT)
Dept: CARDIOLOGY | Age: 30
DRG: 092 | End: 2025-07-14
Attending: SPECIALIST

## 2025-07-14 ENCOUNTER — APPOINTMENT (OUTPATIENT)
Dept: CT IMAGING | Age: 30
DRG: 092 | End: 2025-07-14
Attending: STUDENT IN AN ORGANIZED HEALTH CARE EDUCATION/TRAINING PROGRAM

## 2025-07-14 LAB
ALBUMIN SERPL-MCNC: 3.1 G/DL (ref 3.4–5)
ALBUMIN SERPL-MCNC: 3.1 G/DL (ref 3.4–5)
ALBUMIN/GLOB SERPL: 1 {RATIO} (ref 1–2.4)
ALBUMIN/GLOB SERPL: 1 {RATIO} (ref 1–2.4)
ALP SERPL-CCNC: 60 UNITS/L (ref 45–117)
ALP SERPL-CCNC: 60 UNITS/L (ref 45–117)
ALT SERPL-CCNC: 23 UNITS/L
ALT SERPL-CCNC: 23 UNITS/L
ANION GAP SERPL CALC-SCNC: 13 MMOL/L (ref 7–19)
ANION GAP SERPL CALC-SCNC: 13 MMOL/L (ref 7–19)
ANION GAP SERPL CALC-SCNC: 14 MMOL/L (ref 7–19)
ANION GAP SERPL CALC-SCNC: 14 MMOL/L (ref 7–19)
ANION GAP SERPL CALC-SCNC: 15 MMOL/L (ref 7–19)
ANION GAP SERPL CALC-SCNC: 15 MMOL/L (ref 7–19)
AST SERPL-CCNC: 30 UNITS/L
AST SERPL-CCNC: 30 UNITS/L
ATRIAL RATE (BPM): 123
ATRIAL RATE (BPM): 123
BASE EXCESS / DEFICIT, VENOUS - RESPIRATORY: -10 MMOL/L (ref -2–2)
BASE EXCESS / DEFICIT, VENOUS - RESPIRATORY: -10 MMOL/L (ref -2–2)
BASOPHILS # BLD: 0.1 K/MCL (ref 0–0.3)
BASOPHILS # BLD: 0.1 K/MCL (ref 0–0.3)
BASOPHILS NFR BLD: 0 %
BASOPHILS NFR BLD: 0 %
BDY SITE: ABNORMAL
BDY SITE: ABNORMAL
BILIRUB SERPL-MCNC: 1.3 MG/DL (ref 0.2–1)
BILIRUB SERPL-MCNC: 1.3 MG/DL (ref 0.2–1)
BODY TEMPERATURE: 37.2 DEGREES C
BODY TEMPERATURE: 37.2 DEGREES C
BUN SERPL-MCNC: 10 MG/DL (ref 6–20)
BUN SERPL-MCNC: 10 MG/DL (ref 6–20)
BUN SERPL-MCNC: 11 MG/DL (ref 6–20)
BUN/CREAT SERPL: 10 (ref 7–25)
BUN/CREAT SERPL: 10 (ref 7–25)
BUN/CREAT SERPL: 11 (ref 7–25)
C AURIS DNA SPEC QL NAA+PROBE: NOT DETECTED
C AURIS DNA SPEC QL NAA+PROBE: NOT DETECTED
CA-I BLD-SCNC: 1.18 MMOL/L (ref 1.15–1.29)
CA-I BLD-SCNC: 1.18 MMOL/L (ref 1.15–1.29)
CALCIUM SERPL-MCNC: 7.6 MG/DL (ref 8.4–10.2)
CALCIUM SERPL-MCNC: 7.6 MG/DL (ref 8.4–10.2)
CALCIUM SERPL-MCNC: 7.9 MG/DL (ref 8.4–10.2)
CALCIUM SERPL-MCNC: 7.9 MG/DL (ref 8.4–10.2)
CALCIUM SERPL-MCNC: 8.2 MG/DL (ref 8.4–10.2)
CALCIUM SERPL-MCNC: 8.2 MG/DL (ref 8.4–10.2)
CHLORIDE SERPL-SCNC: 106 MMOL/L (ref 97–110)
CHLORIDE SERPL-SCNC: 107 MMOL/L (ref 97–110)
CHLORIDE SERPL-SCNC: 107 MMOL/L (ref 97–110)
CO2 SERPL-SCNC: 18 MMOL/L (ref 21–32)
CO2 SERPL-SCNC: 18 MMOL/L (ref 21–32)
CO2 SERPL-SCNC: 20 MMOL/L (ref 21–32)
CO2 SERPL-SCNC: 20 MMOL/L (ref 21–32)
CO2 SERPL-SCNC: 21 MMOL/L (ref 21–32)
CO2 SERPL-SCNC: 21 MMOL/L (ref 21–32)
COHGB MFR BLDV: 1.8 %
COHGB MFR BLDV: 1.8 %
CONDITION: ABNORMAL
CONDITION: ABNORMAL
CREAT SERPL-MCNC: 0.96 MG/DL (ref 0.67–1.17)
CREAT SERPL-MCNC: 0.96 MG/DL (ref 0.67–1.17)
CREAT SERPL-MCNC: 0.99 MG/DL (ref 0.67–1.17)
CREAT SERPL-MCNC: 0.99 MG/DL (ref 0.67–1.17)
CREAT SERPL-MCNC: 1 MG/DL (ref 0.67–1.17)
CREAT SERPL-MCNC: 1 MG/DL (ref 0.67–1.17)
DEPRECATED RDW RBC: 46.3 FL (ref 39–50)
DEPRECATED RDW RBC: 46.3 FL (ref 39–50)
EGFRCR SERPLBLD CKD-EPI 2021: >90 ML/MIN/{1.73_M2}
EOSINOPHIL # BLD: 0 K/MCL (ref 0–0.5)
EOSINOPHIL # BLD: 0 K/MCL (ref 0–0.5)
EOSINOPHIL NFR BLD: 0 %
EOSINOPHIL NFR BLD: 0 %
ERYTHROCYTE [DISTWIDTH] IN BLOOD: 13.5 % (ref 11–15)
ERYTHROCYTE [DISTWIDTH] IN BLOOD: 13.5 % (ref 11–15)
FASTING DURATION TIME PATIENT: ABNORMAL H
GLOBULIN SER-MCNC: 3.1 G/DL (ref 2–4)
GLOBULIN SER-MCNC: 3.1 G/DL (ref 2–4)
GLUCOSE BLDC GLUCOMTR-MCNC: 140 MG/DL (ref 70–99)
GLUCOSE BLDC GLUCOMTR-MCNC: 140 MG/DL (ref 70–99)
GLUCOSE BLDC GLUCOMTR-MCNC: 168 MG/DL (ref 70–99)
GLUCOSE BLDC GLUCOMTR-MCNC: 168 MG/DL (ref 70–99)
GLUCOSE BLDC GLUCOMTR-MCNC: 177 MG/DL (ref 70–99)
GLUCOSE BLDC GLUCOMTR-MCNC: 177 MG/DL (ref 70–99)
GLUCOSE BLDC GLUCOMTR-MCNC: 179 MG/DL (ref 70–99)
GLUCOSE BLDC GLUCOMTR-MCNC: 179 MG/DL (ref 70–99)
GLUCOSE BLDC GLUCOMTR-MCNC: 235 MG/DL (ref 70–99)
GLUCOSE BLDC GLUCOMTR-MCNC: 235 MG/DL (ref 70–99)
GLUCOSE BLDC GLUCOMTR-MCNC: 280 MG/DL (ref 70–99)
GLUCOSE BLDC GLUCOMTR-MCNC: 280 MG/DL (ref 70–99)
GLUCOSE SERPL-MCNC: 158 MG/DL (ref 70–99)
GLUCOSE SERPL-MCNC: 158 MG/DL (ref 70–99)
GLUCOSE SERPL-MCNC: 208 MG/DL (ref 70–99)
GLUCOSE SERPL-MCNC: 208 MG/DL (ref 70–99)
GLUCOSE SERPL-MCNC: 248 MG/DL (ref 70–99)
GLUCOSE SERPL-MCNC: 248 MG/DL (ref 70–99)
HCO3 BLDV-SCNC: 16 MMOL/L (ref 22–28)
HCO3 BLDV-SCNC: 16 MMOL/L (ref 22–28)
HCT VFR BLD CALC: 41 % (ref 39–51)
HCT VFR BLD CALC: 41 % (ref 39–51)
HGB BLD-MCNC: 13.9 G/DL (ref 13–17)
HGB BLD-MCNC: 13.9 G/DL (ref 13–17)
HGB BLD-MCNC: 14.3 G/DL (ref 13–17)
HGB BLD-MCNC: 14.3 G/DL (ref 13–17)
IMM GRANULOCYTES # BLD AUTO: 0.1 K/MCL (ref 0–0.2)
IMM GRANULOCYTES # BLD AUTO: 0.1 K/MCL (ref 0–0.2)
IMM GRANULOCYTES # BLD: 1 %
IMM GRANULOCYTES # BLD: 1 %
LACTATE BLDV-SCNC: 1 MMOL/L
LACTATE BLDV-SCNC: 1 MMOL/L
LITHIUM SERPL-SCNC: <0.2 MMOL/L (ref 0.6–1.2)
LITHIUM SERPL-SCNC: <0.2 MMOL/L (ref 0.6–1.2)
LYMPHOCYTES # BLD: 1.9 K/MCL (ref 1–4.8)
LYMPHOCYTES # BLD: 1.9 K/MCL (ref 1–4.8)
LYMPHOCYTES NFR BLD: 10 %
LYMPHOCYTES NFR BLD: 10 %
MAGNESIUM SERPL-MCNC: 1.8 MG/DL (ref 1.7–2.4)
MAGNESIUM SERPL-MCNC: 1.8 MG/DL (ref 1.7–2.4)
MAGNESIUM SERPL-MCNC: 1.9 MG/DL (ref 1.7–2.4)
MAGNESIUM SERPL-MCNC: 1.9 MG/DL (ref 1.7–2.4)
MCH RBC QN AUTO: 31.8 PG (ref 26–34)
MCH RBC QN AUTO: 31.8 PG (ref 26–34)
MCHC RBC AUTO-ENTMCNC: 33.9 G/DL (ref 32–36.5)
MCHC RBC AUTO-ENTMCNC: 33.9 G/DL (ref 32–36.5)
MCV RBC AUTO: 93.8 FL (ref 78–100)
MCV RBC AUTO: 93.8 FL (ref 78–100)
METHGB MFR BLDMV: 0.9 %
METHGB MFR BLDMV: 0.9 %
MONOCYTES # BLD: 2.1 K/MCL (ref 0.3–0.9)
MONOCYTES # BLD: 2.1 K/MCL (ref 0.3–0.9)
MONOCYTES NFR BLD: 11 %
MONOCYTES NFR BLD: 11 %
NEUTROPHILS # BLD: 15.4 K/MCL (ref 1.8–7.7)
NEUTROPHILS # BLD: 15.4 K/MCL (ref 1.8–7.7)
NEUTROPHILS NFR BLD: 78 %
NEUTROPHILS NFR BLD: 78 %
NRBC BLD MANUAL-RTO: 0 /100 WBC
NRBC BLD MANUAL-RTO: 0 /100 WBC
OXYHGB MFR BLDV: 85.8 % (ref 60–80)
OXYHGB MFR BLDV: 85.8 % (ref 60–80)
P AXIS (DEGREES): 63
P AXIS (DEGREES): 63
PCO2 BLDV: 32 MM HG (ref 41–54)
PCO2 BLDV: 32 MM HG (ref 41–54)
PH BLDV: 7.3 UNITS (ref 7.35–7.45)
PH BLDV: 7.3 UNITS (ref 7.35–7.45)
PHOSPHATE SERPL-MCNC: 1.4 MG/DL (ref 2.4–4.7)
PHOSPHATE SERPL-MCNC: 1.4 MG/DL (ref 2.4–4.7)
PHOSPHATE SERPL-MCNC: 2.8 MG/DL (ref 2.4–4.7)
PHOSPHATE SERPL-MCNC: 2.8 MG/DL (ref 2.4–4.7)
PLATELET # BLD AUTO: 279 K/MCL (ref 140–450)
PLATELET # BLD AUTO: 279 K/MCL (ref 140–450)
PO2 BLDV: 56 MM HG (ref 35–42)
PO2 BLDV: 56 MM HG (ref 35–42)
POTASSIUM BLD-SCNC: 4.2 MMOL/L (ref 3.4–5.1)
POTASSIUM BLD-SCNC: 4.2 MMOL/L (ref 3.4–5.1)
POTASSIUM SERPL-SCNC: 3.7 MMOL/L (ref 3.4–5.1)
POTASSIUM SERPL-SCNC: 3.7 MMOL/L (ref 3.4–5.1)
POTASSIUM SERPL-SCNC: 4.1 MMOL/L (ref 3.4–5.1)
POTASSIUM SERPL-SCNC: 4.1 MMOL/L (ref 3.4–5.1)
POTASSIUM SERPL-SCNC: 4.2 MMOL/L (ref 3.4–5.1)
POTASSIUM SERPL-SCNC: 4.2 MMOL/L (ref 3.4–5.1)
PR-INTERVAL (MSEC): 110
PR-INTERVAL (MSEC): 110
PROT SERPL-MCNC: 6.2 G/DL (ref 6.4–8.2)
PROT SERPL-MCNC: 6.2 G/DL (ref 6.4–8.2)
QRS-INTERVAL (MSEC): 94
QRS-INTERVAL (MSEC): 94
QT-INTERVAL (MSEC): 326
QT-INTERVAL (MSEC): 326
QTC: 466
QTC: 466
R AXIS (DEGREES): 79
R AXIS (DEGREES): 79
RBC # BLD: 4.37 MIL/MCL (ref 4.5–5.9)
RBC # BLD: 4.37 MIL/MCL (ref 4.5–5.9)
REPORT TEXT: NORMAL
REPORT TEXT: NORMAL
SAO2 DF BLDV: 88 % (ref 60–80)
SAO2 DF BLDV: 88 % (ref 60–80)
SODIUM BLD-SCNC: 133 MMOL/L (ref 135–145)
SODIUM BLD-SCNC: 133 MMOL/L (ref 135–145)
SODIUM SERPL-SCNC: 135 MMOL/L (ref 135–145)
SODIUM SERPL-SCNC: 135 MMOL/L (ref 135–145)
SODIUM SERPL-SCNC: 136 MMOL/L (ref 135–145)
SODIUM SERPL-SCNC: 136 MMOL/L (ref 135–145)
SODIUM SERPL-SCNC: 137 MMOL/L (ref 135–145)
SODIUM SERPL-SCNC: 137 MMOL/L (ref 135–145)
T AXIS (DEGREES): 27
T AXIS (DEGREES): 27
VENTRICULAR RATE EKG/MIN (BPM): 123
VENTRICULAR RATE EKG/MIN (BPM): 123
WBC # BLD: 19.6 K/MCL (ref 4.2–11)
WBC # BLD: 19.6 K/MCL (ref 4.2–11)

## 2025-07-14 PROCEDURE — 80053 COMPREHEN METABOLIC PANEL: CPT

## 2025-07-14 PROCEDURE — 85018 HEMOGLOBIN: CPT

## 2025-07-14 PROCEDURE — 96372 THER/PROPH/DIAG INJ SC/IM: CPT | Performed by: HOSPITALIST

## 2025-07-14 PROCEDURE — 10002807 HB RX 258

## 2025-07-14 PROCEDURE — 10002800 HB RX 250 W HCPCS

## 2025-07-14 PROCEDURE — 82805 BLOOD GASES W/O2 SATURATION: CPT

## 2025-07-14 PROCEDURE — 83605 ASSAY OF LACTIC ACID: CPT

## 2025-07-14 PROCEDURE — 90792 PSYCH DIAG EVAL W/MED SRVCS: CPT | Performed by: PSYCHIATRY & NEUROLOGY

## 2025-07-14 PROCEDURE — 96372 THER/PROPH/DIAG INJ SC/IM: CPT

## 2025-07-14 PROCEDURE — 82330 ASSAY OF CALCIUM: CPT

## 2025-07-14 PROCEDURE — 83735 ASSAY OF MAGNESIUM: CPT | Performed by: STUDENT IN AN ORGANIZED HEALTH CARE EDUCATION/TRAINING PROGRAM

## 2025-07-14 PROCEDURE — 10002800 HB RX 250 W HCPCS: Performed by: HOSPITALIST

## 2025-07-14 PROCEDURE — 85025 COMPLETE CBC W/AUTO DIFF WBC: CPT | Performed by: STUDENT IN AN ORGANIZED HEALTH CARE EDUCATION/TRAINING PROGRAM

## 2025-07-14 PROCEDURE — 10004651 HB RX, NO CHARGE ITEM

## 2025-07-14 PROCEDURE — 84295 ASSAY OF SERUM SODIUM: CPT

## 2025-07-14 PROCEDURE — 70450 CT HEAD/BRAIN W/O DYE: CPT

## 2025-07-14 PROCEDURE — 84100 ASSAY OF PHOSPHORUS: CPT

## 2025-07-14 PROCEDURE — 99232 SBSQ HOSP IP/OBS MODERATE 35: CPT

## 2025-07-14 PROCEDURE — 84443 ASSAY THYROID STIM HORMONE: CPT | Performed by: INTERNAL MEDICINE

## 2025-07-14 PROCEDURE — 10002803 HB RX 637

## 2025-07-14 PROCEDURE — 10000002 HB ROOM CHARGE MED SURG

## 2025-07-14 PROCEDURE — 10002801 HB RX 250 W/O HCPCS

## 2025-07-14 PROCEDURE — 80178 ASSAY OF LITHIUM: CPT

## 2025-07-14 PROCEDURE — A4216 STERILE WATER/SALINE, 10 ML: HCPCS

## 2025-07-14 PROCEDURE — 84132 ASSAY OF SERUM POTASSIUM: CPT

## 2025-07-14 PROCEDURE — 10002805 HB CONTRAST AGENT: Performed by: STUDENT IN AN ORGANIZED HEALTH CARE EDUCATION/TRAINING PROGRAM

## 2025-07-14 PROCEDURE — 70498 CT ANGIOGRAPHY NECK: CPT

## 2025-07-14 PROCEDURE — 80048 BASIC METABOLIC PNL TOTAL CA: CPT | Performed by: STUDENT IN AN ORGANIZED HEALTH CARE EDUCATION/TRAINING PROGRAM

## 2025-07-14 PROCEDURE — 84100 ASSAY OF PHOSPHORUS: CPT | Performed by: STUDENT IN AN ORGANIZED HEALTH CARE EDUCATION/TRAINING PROGRAM

## 2025-07-14 RX ORDER — NICOTINE POLACRILEX 4 MG
30 LOZENGE BUCCAL PRN
Status: DISCONTINUED | OUTPATIENT
Start: 2025-07-14 | End: 2025-07-22 | Stop reason: HOSPADM

## 2025-07-14 RX ORDER — INSULIN LISPRO 100 [IU]/ML
5 INJECTION, SOLUTION INTRAVENOUS; SUBCUTANEOUS
Status: DISCONTINUED | OUTPATIENT
Start: 2025-07-14 | End: 2025-07-20

## 2025-07-14 RX ORDER — CHLORHEXIDINE GLUCONATE ORAL RINSE 1.2 MG/ML
15 SOLUTION DENTAL EVERY 12 HOURS SCHEDULED
Status: DISCONTINUED | OUTPATIENT
Start: 2025-07-14 | End: 2025-07-17

## 2025-07-14 RX ORDER — INSULIN GLARGINE 100 [IU]/ML
25 INJECTION, SOLUTION SUBCUTANEOUS NIGHTLY
Status: DISCONTINUED | OUTPATIENT
Start: 2025-07-15 | End: 2025-07-14

## 2025-07-14 RX ORDER — INSULIN GLARGINE 100 [IU]/ML
15 INJECTION, SOLUTION SUBCUTANEOUS NIGHTLY
Status: DISCONTINUED | OUTPATIENT
Start: 2025-07-15 | End: 2025-07-15

## 2025-07-14 RX ORDER — DEXTROSE MONOHYDRATE 25 G/50ML
12.5 INJECTION, SOLUTION INTRAVENOUS PRN
Status: DISCONTINUED | OUTPATIENT
Start: 2025-07-14 | End: 2025-07-22 | Stop reason: HOSPADM

## 2025-07-14 RX ORDER — INSULIN GLARGINE 100 [IU]/ML
15 INJECTION, SOLUTION SUBCUTANEOUS NIGHTLY
Status: DISCONTINUED | OUTPATIENT
Start: 2025-07-14 | End: 2025-07-14

## 2025-07-14 RX ORDER — DEXTROSE MONOHYDRATE 25 G/50ML
25 INJECTION, SOLUTION INTRAVENOUS PRN
Status: DISCONTINUED | OUTPATIENT
Start: 2025-07-14 | End: 2025-07-22 | Stop reason: HOSPADM

## 2025-07-14 RX ORDER — INSULIN GLARGINE 100 [IU]/ML
15 INJECTION, SOLUTION SUBCUTANEOUS EVERY 12 HOURS SCHEDULED
Status: DISCONTINUED | OUTPATIENT
Start: 2025-07-14 | End: 2025-07-14

## 2025-07-14 RX ORDER — QUETIAPINE FUMARATE 100 MG/1
100 TABLET, FILM COATED ORAL EVERY 12 HOURS SCHEDULED
Status: DISCONTINUED | OUTPATIENT
Start: 2025-07-14 | End: 2025-07-22 | Stop reason: HOSPADM

## 2025-07-14 RX ORDER — QUETIAPINE FUMARATE 25 MG/1
50 TABLET, FILM COATED ORAL EVERY 12 HOURS SCHEDULED
Status: DISCONTINUED | OUTPATIENT
Start: 2025-07-14 | End: 2025-07-14

## 2025-07-14 RX ORDER — NICOTINE POLACRILEX 4 MG
15 LOZENGE BUCCAL PRN
Status: DISCONTINUED | OUTPATIENT
Start: 2025-07-14 | End: 2025-07-22 | Stop reason: HOSPADM

## 2025-07-14 RX ORDER — INSULIN GLARGINE 100 [IU]/ML
10 INJECTION, SOLUTION SUBCUTANEOUS NIGHTLY
Status: COMPLETED | OUTPATIENT
Start: 2025-07-14 | End: 2025-07-14

## 2025-07-14 RX ORDER — ZIPRASIDONE MESYLATE 20 MG/ML
20 INJECTION, POWDER, LYOPHILIZED, FOR SOLUTION INTRAMUSCULAR EVERY 12 HOURS PRN
Status: DISCONTINUED | OUTPATIENT
Start: 2025-07-14 | End: 2025-07-22 | Stop reason: HOSPADM

## 2025-07-14 RX ORDER — QUETIAPINE FUMARATE 25 MG/1
50 TABLET, FILM COATED ORAL EVERY 4 HOURS PRN
Status: DISCONTINUED | OUTPATIENT
Start: 2025-07-14 | End: 2025-07-22 | Stop reason: HOSPADM

## 2025-07-14 RX ADMIN — IOHEXOL 85 ML: 350 INJECTION, SOLUTION INTRAVENOUS at 10:12

## 2025-07-14 RX ADMIN — AMOXICILLIN AND CLAVULANATE POTASSIUM 1 TABLET: 875; 125 TABLET, FILM COATED ORAL at 20:54

## 2025-07-14 RX ADMIN — INSULIN GLARGINE 10 UNITS: 100 INJECTION, SOLUTION SUBCUTANEOUS at 09:20

## 2025-07-14 RX ADMIN — INSULIN LISPRO 5 UNITS: 100 INJECTION, SOLUTION INTRAVENOUS; SUBCUTANEOUS at 18:07

## 2025-07-14 RX ADMIN — PIPERACILLIN AND TAZOBACTAM 3.38 G: 3; .375 INJECTION, POWDER, FOR SOLUTION INTRAVENOUS at 05:37

## 2025-07-14 RX ADMIN — SODIUM BICARBONATE 25 MEQ/HR: 84 INJECTION, SOLUTION INTRAVENOUS at 06:26

## 2025-07-14 RX ADMIN — SODIUM CHLORIDE, PRESERVATIVE FREE 2 ML: 5 INJECTION INTRAVENOUS at 08:04

## 2025-07-14 RX ADMIN — SODIUM CHLORIDE, POTASSIUM CHLORIDE, SODIUM LACTATE AND CALCIUM CHLORIDE 1000 ML: 600; 310; 30; 20 INJECTION, SOLUTION INTRAVENOUS at 16:12

## 2025-07-14 RX ADMIN — AMOXICILLIN AND CLAVULANATE POTASSIUM 1 TABLET: 875; 125 TABLET, FILM COATED ORAL at 15:00

## 2025-07-14 RX ADMIN — SODIUM CHLORIDE, PRESERVATIVE FREE 2 ML: 5 INJECTION INTRAVENOUS at 20:44

## 2025-07-14 RX ADMIN — INSULIN LISPRO 9 UNITS: 100 INJECTION, SOLUTION INTRAVENOUS; SUBCUTANEOUS at 06:31

## 2025-07-14 RX ADMIN — HEPARIN SODIUM 5000 UNITS: 5000 INJECTION INTRAVENOUS; SUBCUTANEOUS at 14:37

## 2025-07-14 RX ADMIN — QUETIAPINE FUMARATE 50 MG: 25 TABLET, FILM COATED ORAL at 09:20

## 2025-07-14 RX ADMIN — HYDROMORPHONE HYDROCHLORIDE 0.4 MG: 1 INJECTION, SOLUTION INTRAMUSCULAR; INTRAVENOUS; SUBCUTANEOUS at 08:13

## 2025-07-14 RX ADMIN — HYDROMORPHONE HYDROCHLORIDE 0.4 MG: 1 INJECTION, SOLUTION INTRAMUSCULAR; INTRAVENOUS; SUBCUTANEOUS at 10:21

## 2025-07-14 RX ADMIN — QUETIAPINE FUMARATE 100 MG: 100 TABLET, FILM COATED ORAL at 20:41

## 2025-07-14 RX ADMIN — INSULIN GLARGINE 10 UNITS: 100 INJECTION, SOLUTION SUBCUTANEOUS at 20:41

## 2025-07-14 RX ADMIN — HYDROMORPHONE HYDROCHLORIDE 0.4 MG: 1 INJECTION, SOLUTION INTRAMUSCULAR; INTRAVENOUS; SUBCUTANEOUS at 20:42

## 2025-07-14 RX ADMIN — HALOPERIDOL LACTATE 2 MG: 5 INJECTION, SOLUTION INTRAMUSCULAR at 23:49

## 2025-07-14 RX ADMIN — HYDROMORPHONE HYDROCHLORIDE 0.4 MG: 1 INJECTION, SOLUTION INTRAMUSCULAR; INTRAVENOUS; SUBCUTANEOUS at 16:35

## 2025-07-14 RX ADMIN — THIAMINE HYDROCHLORIDE 100 MG: 100 INJECTION, SOLUTION INTRAMUSCULAR; INTRAVENOUS at 08:03

## 2025-07-14 RX ADMIN — HEPARIN SODIUM 5000 UNITS: 5000 INJECTION INTRAVENOUS; SUBCUTANEOUS at 20:41

## 2025-07-14 RX ADMIN — HYDROMORPHONE HYDROCHLORIDE 0.4 MG: 1 INJECTION, SOLUTION INTRAMUSCULAR; INTRAVENOUS; SUBCUTANEOUS at 23:48

## 2025-07-14 RX ADMIN — HYDROMORPHONE HYDROCHLORIDE 0.4 MG: 1 INJECTION, SOLUTION INTRAMUSCULAR; INTRAVENOUS; SUBCUTANEOUS at 05:38

## 2025-07-14 RX ADMIN — FOLIC ACID 1 MG: 5 INJECTION, SOLUTION INTRAMUSCULAR; INTRAVENOUS; SUBCUTANEOUS at 08:03

## 2025-07-14 ASSESSMENT — COGNITIVE AND FUNCTIONAL STATUS - GENERAL
BECAUSE OF A PHYSICAL, MENTAL, OR EMOTIONAL CONDITION, DO YOU HAVE DIFFICULTY DOING ERRANDS ALONE: NO
BECAUSE OF A PHYSICAL, MENTAL, OR EMOTIONAL CONDITION, DO YOU HAVE SERIOUS DIFFICULTY CONCENTRATING, REMEMBERING OR MAKING DECISIONS: YES
DO YOU HAVE SERIOUS DIFFICULTY WALKING OR CLIMBING STAIRS: NO
DO YOU HAVE DIFFICULTY DRESSING OR BATHING: NO

## 2025-07-14 ASSESSMENT — PAIN SCALES - GENERAL
PAINLEVEL_OUTOF10: 10
PAINLEVEL_OUTOF10: 9
PAINLEVEL_OUTOF10: 0
PAINLEVEL_OUTOF10: 10
PAINLEVEL_OUTOF10: 10
PAINLEVEL_OUTOF10: 0
PAINLEVEL_OUTOF10: 9
PAINLEVEL_OUTOF10: 0
PAINLEVEL_OUTOF10: 10

## 2025-07-14 ASSESSMENT — ENCOUNTER SYMPTOMS
ABDOMINAL PAIN: 0
SHORTNESS OF BREATH: 0
NUMBNESS: 1

## 2025-07-14 ASSESSMENT — PAIN SCALES - WONG BAKER
WONGBAKER_NUMERICALRESPONSE: 0
WONGBAKER_NUMERICALRESPONSE: 0

## 2025-07-15 LAB
ALBUMIN SERPL-MCNC: 2.7 G/DL (ref 3.4–5)
ALBUMIN SERPL-MCNC: 2.7 G/DL (ref 3.4–5)
ALBUMIN/GLOB SERPL: 0.8 {RATIO} (ref 1–2.4)
ALBUMIN/GLOB SERPL: 0.8 {RATIO} (ref 1–2.4)
ALP SERPL-CCNC: 59 UNITS/L (ref 45–117)
ALP SERPL-CCNC: 59 UNITS/L (ref 45–117)
ALT SERPL-CCNC: 24 UNITS/L
ALT SERPL-CCNC: 24 UNITS/L
AMPHETAMINES UR QL SCN>500 NG/ML: NEGATIVE
AMPHETAMINES UR QL SCN>500 NG/ML: NEGATIVE
ANION GAP SERPL CALC-SCNC: 14 MMOL/L (ref 7–19)
ANION GAP SERPL CALC-SCNC: 14 MMOL/L (ref 7–19)
AST SERPL-CCNC: 23 UNITS/L
AST SERPL-CCNC: 23 UNITS/L
BARBITURATES UR QL SCN>200 NG/ML: NEGATIVE
BARBITURATES UR QL SCN>200 NG/ML: NEGATIVE
BASOPHILS # BLD: 0 K/MCL (ref 0–0.3)
BASOPHILS # BLD: 0 K/MCL (ref 0–0.3)
BASOPHILS NFR BLD: 0 %
BASOPHILS NFR BLD: 0 %
BENZODIAZ UR QL SCN>200 NG/ML: NEGATIVE
BENZODIAZ UR QL SCN>200 NG/ML: NEGATIVE
BILIRUB SERPL-MCNC: 0.7 MG/DL (ref 0.2–1)
BILIRUB SERPL-MCNC: 0.7 MG/DL (ref 0.2–1)
BUN SERPL-MCNC: <5 MG/DL (ref 6–20)
BUN SERPL-MCNC: <5 MG/DL (ref 6–20)
BUN/CREAT SERPL: ABNORMAL
BUN/CREAT SERPL: ABNORMAL
BZE UR QL SCN>150 NG/ML: POSITIVE
BZE UR QL SCN>150 NG/ML: POSITIVE
CALCIUM SERPL-MCNC: 8.3 MG/DL (ref 8.4–10.2)
CALCIUM SERPL-MCNC: 8.3 MG/DL (ref 8.4–10.2)
CANNABINOIDS UR QL SCN>50 NG/ML: POSITIVE
CANNABINOIDS UR QL SCN>50 NG/ML: POSITIVE
CHLORIDE SERPL-SCNC: 103 MMOL/L (ref 97–110)
CHLORIDE SERPL-SCNC: 103 MMOL/L (ref 97–110)
CO2 SERPL-SCNC: 24 MMOL/L (ref 21–32)
CO2 SERPL-SCNC: 24 MMOL/L (ref 21–32)
CREAT SERPL-MCNC: 0.73 MG/DL (ref 0.67–1.17)
CREAT SERPL-MCNC: 0.73 MG/DL (ref 0.67–1.17)
DEPRECATED RDW RBC: 43.8 FL (ref 39–50)
DEPRECATED RDW RBC: 43.8 FL (ref 39–50)
EGFRCR SERPLBLD CKD-EPI 2021: >90 ML/MIN/{1.73_M2}
EGFRCR SERPLBLD CKD-EPI 2021: >90 ML/MIN/{1.73_M2}
EOSINOPHIL # BLD: 0 K/MCL (ref 0–0.5)
EOSINOPHIL # BLD: 0 K/MCL (ref 0–0.5)
EOSINOPHIL NFR BLD: 0 %
EOSINOPHIL NFR BLD: 0 %
ERYTHROCYTE [DISTWIDTH] IN BLOOD: 13.1 % (ref 11–15)
ERYTHROCYTE [DISTWIDTH] IN BLOOD: 13.1 % (ref 11–15)
FASTING DURATION TIME PATIENT: ABNORMAL H
FASTING DURATION TIME PATIENT: ABNORMAL H
FENTANYL UR QL SCN: NEGATIVE
FENTANYL UR QL SCN: NEGATIVE
GLOBULIN SER-MCNC: 3.4 G/DL (ref 2–4)
GLOBULIN SER-MCNC: 3.4 G/DL (ref 2–4)
GLUCOSE BLDC GLUCOMTR-MCNC: 142 MG/DL (ref 70–99)
GLUCOSE BLDC GLUCOMTR-MCNC: 142 MG/DL (ref 70–99)
GLUCOSE BLDC GLUCOMTR-MCNC: 150 MG/DL (ref 70–99)
GLUCOSE BLDC GLUCOMTR-MCNC: 150 MG/DL (ref 70–99)
GLUCOSE BLDC GLUCOMTR-MCNC: 167 MG/DL (ref 70–99)
GLUCOSE BLDC GLUCOMTR-MCNC: 167 MG/DL (ref 70–99)
GLUCOSE BLDC GLUCOMTR-MCNC: 178 MG/DL (ref 70–99)
GLUCOSE BLDC GLUCOMTR-MCNC: 178 MG/DL (ref 70–99)
GLUCOSE BLDC GLUCOMTR-MCNC: 248 MG/DL (ref 70–99)
GLUCOSE BLDC GLUCOMTR-MCNC: 248 MG/DL (ref 70–99)
GLUCOSE SERPL-MCNC: 184 MG/DL (ref 70–99)
GLUCOSE SERPL-MCNC: 184 MG/DL (ref 70–99)
HCT VFR BLD CALC: 40 % (ref 39–51)
HCT VFR BLD CALC: 40 % (ref 39–51)
HGB BLD-MCNC: 13.6 G/DL (ref 13–17)
HGB BLD-MCNC: 13.6 G/DL (ref 13–17)
IMM GRANULOCYTES # BLD AUTO: 0 K/MCL (ref 0–0.2)
IMM GRANULOCYTES # BLD AUTO: 0 K/MCL (ref 0–0.2)
IMM GRANULOCYTES # BLD: 0 %
IMM GRANULOCYTES # BLD: 0 %
LYMPHOCYTES # BLD: 2.2 K/MCL (ref 1–4.8)
LYMPHOCYTES # BLD: 2.2 K/MCL (ref 1–4.8)
LYMPHOCYTES NFR BLD: 21 %
LYMPHOCYTES NFR BLD: 21 %
MAGNESIUM SERPL-MCNC: 1.9 MG/DL (ref 1.7–2.4)
MAGNESIUM SERPL-MCNC: 2 MG/DL (ref 1.7–2.4)
MAGNESIUM SERPL-MCNC: 2 MG/DL (ref 1.7–2.4)
MCH RBC QN AUTO: 31.1 PG (ref 26–34)
MCH RBC QN AUTO: 31.1 PG (ref 26–34)
MCHC RBC AUTO-ENTMCNC: 34 G/DL (ref 32–36.5)
MCHC RBC AUTO-ENTMCNC: 34 G/DL (ref 32–36.5)
MCV RBC AUTO: 91.3 FL (ref 78–100)
MCV RBC AUTO: 91.3 FL (ref 78–100)
MONOCYTES # BLD: 1.3 K/MCL (ref 0.3–0.9)
MONOCYTES # BLD: 1.3 K/MCL (ref 0.3–0.9)
MONOCYTES NFR BLD: 13 %
MONOCYTES NFR BLD: 13 %
NEUTROPHILS # BLD: 6.6 K/MCL (ref 1.8–7.7)
NEUTROPHILS # BLD: 6.6 K/MCL (ref 1.8–7.7)
NEUTROPHILS NFR BLD: 66 %
NEUTROPHILS NFR BLD: 66 %
NRBC BLD MANUAL-RTO: 0 /100 WBC
NRBC BLD MANUAL-RTO: 0 /100 WBC
OPIATES UR QL SCN>300 NG/ML: POSITIVE
OPIATES UR QL SCN>300 NG/ML: POSITIVE
PCP UR QL SCN>25 NG/ML: NEGATIVE
PCP UR QL SCN>25 NG/ML: NEGATIVE
PHOSPHATE SERPL-MCNC: 2 MG/DL (ref 2.4–4.7)
PHOSPHATE SERPL-MCNC: 2 MG/DL (ref 2.4–4.7)
PHOSPHATE SERPL-MCNC: 2.1 MG/DL (ref 2.4–4.7)
PHOSPHATE SERPL-MCNC: 2.1 MG/DL (ref 2.4–4.7)
PHOSPHATE SERPL-MCNC: 2.6 MG/DL (ref 2.4–4.7)
PHOSPHATE SERPL-MCNC: 2.6 MG/DL (ref 2.4–4.7)
PLATELET # BLD AUTO: 233 K/MCL (ref 140–450)
PLATELET # BLD AUTO: 233 K/MCL (ref 140–450)
POTASSIUM SERPL-SCNC: 3.5 MMOL/L (ref 3.4–5.1)
POTASSIUM SERPL-SCNC: 3.5 MMOL/L (ref 3.4–5.1)
PROT SERPL-MCNC: 6.1 G/DL (ref 6.4–8.2)
PROT SERPL-MCNC: 6.1 G/DL (ref 6.4–8.2)
RBC # BLD: 4.38 MIL/MCL (ref 4.5–5.9)
RBC # BLD: 4.38 MIL/MCL (ref 4.5–5.9)
SODIUM SERPL-SCNC: 137 MMOL/L (ref 135–145)
SODIUM SERPL-SCNC: 137 MMOL/L (ref 135–145)
TSH SERPL-ACNC: 0.37 MCUNITS/ML (ref 0.35–5)
TSH SERPL-ACNC: 0.37 MCUNITS/ML (ref 0.35–5)
TSH SERPL-ACNC: 1.1 MCUNITS/ML (ref 0.35–5)
TSH SERPL-ACNC: 1.1 MCUNITS/ML (ref 0.35–5)
WBC # BLD: 10.1 K/MCL (ref 4.2–11)
WBC # BLD: 10.1 K/MCL (ref 4.2–11)

## 2025-07-15 PROCEDURE — 96372 THER/PROPH/DIAG INJ SC/IM: CPT | Performed by: SPECIALIST

## 2025-07-15 PROCEDURE — 36415 COLL VENOUS BLD VENIPUNCTURE: CPT

## 2025-07-15 PROCEDURE — 10004651 HB RX, NO CHARGE ITEM

## 2025-07-15 PROCEDURE — 10002800 HB RX 250 W HCPCS

## 2025-07-15 PROCEDURE — 84443 ASSAY THYROID STIM HORMONE: CPT | Performed by: PSYCHIATRY & NEUROLOGY

## 2025-07-15 PROCEDURE — 96372 THER/PROPH/DIAG INJ SC/IM: CPT

## 2025-07-15 PROCEDURE — 83735 ASSAY OF MAGNESIUM: CPT | Performed by: STUDENT IN AN ORGANIZED HEALTH CARE EDUCATION/TRAINING PROGRAM

## 2025-07-15 PROCEDURE — A9150 MISC/EXPER NON-PRESCRIPT DRU: HCPCS

## 2025-07-15 PROCEDURE — 84100 ASSAY OF PHOSPHORUS: CPT | Performed by: STUDENT IN AN ORGANIZED HEALTH CARE EDUCATION/TRAINING PROGRAM

## 2025-07-15 PROCEDURE — 10002803 HB RX 637

## 2025-07-15 PROCEDURE — 10006031 HB ROOM CHARGE TELEMETRY

## 2025-07-15 PROCEDURE — 85025 COMPLETE CBC W/AUTO DIFF WBC: CPT | Performed by: STUDENT IN AN ORGANIZED HEALTH CARE EDUCATION/TRAINING PROGRAM

## 2025-07-15 PROCEDURE — 10002800 HB RX 250 W HCPCS: Performed by: SPECIALIST

## 2025-07-15 PROCEDURE — 80053 COMPREHEN METABOLIC PANEL: CPT

## 2025-07-15 PROCEDURE — 10002803 HB RX 637: Performed by: STUDENT IN AN ORGANIZED HEALTH CARE EDUCATION/TRAINING PROGRAM

## 2025-07-15 PROCEDURE — 80307 DRUG TEST PRSMV CHEM ANLYZR: CPT | Performed by: SPECIALIST

## 2025-07-15 PROCEDURE — A4216 STERILE WATER/SALINE, 10 ML: HCPCS

## 2025-07-15 RX ORDER — INSULIN GLARGINE 100 [IU]/ML
18 INJECTION, SOLUTION SUBCUTANEOUS NIGHTLY
Status: DISCONTINUED | OUTPATIENT
Start: 2025-07-15 | End: 2025-07-18

## 2025-07-15 RX ORDER — OXYCODONE HYDROCHLORIDE 5 MG/1
5 TABLET ORAL EVERY 4 HOURS PRN
Refills: 0 | Status: DISCONTINUED | OUTPATIENT
Start: 2025-07-15 | End: 2025-07-16

## 2025-07-15 RX ORDER — OXYCODONE HYDROCHLORIDE 5 MG/1
5 TABLET ORAL EVERY 6 HOURS
Refills: 0 | Status: DISCONTINUED | OUTPATIENT
Start: 2025-07-15 | End: 2025-07-16

## 2025-07-15 RX ORDER — QUETIAPINE FUMARATE 200 MG/1
200 TABLET, FILM COATED ORAL DAILY
Status: ON HOLD | COMMUNITY
End: 2025-07-22 | Stop reason: HOSPADM

## 2025-07-15 RX ORDER — INSULIN LISPRO 200 [IU]/ML
15 INJECTION, SOLUTION SUBCUTANEOUS 3 TIMES DAILY
COMMUNITY
Start: 2025-07-01

## 2025-07-15 RX ORDER — ACETAMINOPHEN 500 MG
1000 TABLET ORAL 4 TIMES DAILY
Status: DISCONTINUED | OUTPATIENT
Start: 2025-07-15 | End: 2025-07-22 | Stop reason: HOSPADM

## 2025-07-15 RX ORDER — OXYCODONE HYDROCHLORIDE 5 MG/1
10 TABLET ORAL EVERY 4 HOURS PRN
Refills: 0 | Status: DISCONTINUED | OUTPATIENT
Start: 2025-07-15 | End: 2025-07-22

## 2025-07-15 RX ORDER — GABAPENTIN 300 MG/1
300 CAPSULE ORAL 3 TIMES DAILY
Status: ON HOLD | COMMUNITY
Start: 2025-07-04 | End: 2025-07-22 | Stop reason: HOSPADM

## 2025-07-15 RX ORDER — INSULIN GLARGINE 100 [IU]/ML
40 INJECTION, SOLUTION SUBCUTANEOUS NIGHTLY
COMMUNITY
Start: 2025-07-04

## 2025-07-15 RX ADMIN — SODIUM CHLORIDE, PRESERVATIVE FREE 2 ML: 5 INJECTION INTRAVENOUS at 22:15

## 2025-07-15 RX ADMIN — OXYCODONE HYDROCHLORIDE 5 MG: 5 TABLET ORAL at 02:52

## 2025-07-15 RX ADMIN — QUETIAPINE FUMARATE 100 MG: 100 TABLET, FILM COATED ORAL at 22:14

## 2025-07-15 RX ADMIN — HEPARIN SODIUM 5000 UNITS: 5000 INJECTION INTRAVENOUS; SUBCUTANEOUS at 22:14

## 2025-07-15 RX ADMIN — INSULIN LISPRO 5 UNITS: 100 INJECTION, SOLUTION INTRAVENOUS; SUBCUTANEOUS at 17:21

## 2025-07-15 RX ADMIN — CHLORHEXIDINE GLUCONATE 15 ML: 1.2 RINSE ORAL at 09:06

## 2025-07-15 RX ADMIN — OXYCODONE HYDROCHLORIDE 5 MG: 5 TABLET ORAL at 09:06

## 2025-07-15 RX ADMIN — SODIUM CHLORIDE, PRESERVATIVE FREE 2 ML: 5 INJECTION INTRAVENOUS at 09:17

## 2025-07-15 RX ADMIN — INSULIN GLARGINE 18 UNITS: 100 INJECTION, SOLUTION SUBCUTANEOUS at 22:15

## 2025-07-15 RX ADMIN — FOLIC ACID 1 MG: 5 INJECTION, SOLUTION INTRAMUSCULAR; INTRAVENOUS; SUBCUTANEOUS at 11:14

## 2025-07-15 RX ADMIN — QUETIAPINE FUMARATE 100 MG: 100 TABLET, FILM COATED ORAL at 09:07

## 2025-07-15 RX ADMIN — ACETAMINOPHEN 1000 MG: 500 TABLET ORAL at 22:13

## 2025-07-15 RX ADMIN — AMOXICILLIN AND CLAVULANATE POTASSIUM 1 TABLET: 875; 125 TABLET, FILM COATED ORAL at 09:06

## 2025-07-15 RX ADMIN — INSULIN LISPRO 5 UNITS: 100 INJECTION, SOLUTION INTRAVENOUS; SUBCUTANEOUS at 11:14

## 2025-07-15 RX ADMIN — INSULIN LISPRO 5 UNITS: 100 INJECTION, SOLUTION INTRAVENOUS; SUBCUTANEOUS at 09:06

## 2025-07-15 RX ADMIN — ACETAMINOPHEN 1000 MG: 500 TABLET ORAL at 09:06

## 2025-07-15 RX ADMIN — HEPARIN SODIUM 5000 UNITS: 5000 INJECTION INTRAVENOUS; SUBCUTANEOUS at 05:45

## 2025-07-15 RX ADMIN — HYDROMORPHONE HYDROCHLORIDE 0.5 MG: 1 INJECTION, SOLUTION INTRAMUSCULAR; INTRAVENOUS; SUBCUTANEOUS at 22:18

## 2025-07-15 RX ADMIN — HYDROMORPHONE HYDROCHLORIDE 0.5 MG: 1 INJECTION, SOLUTION INTRAMUSCULAR; INTRAVENOUS; SUBCUTANEOUS at 17:21

## 2025-07-15 RX ADMIN — CHLORHEXIDINE GLUCONATE 15 ML: 1.2 RINSE ORAL at 22:14

## 2025-07-15 RX ADMIN — THIAMINE HYDROCHLORIDE 100 MG: 100 INJECTION, SOLUTION INTRAMUSCULAR; INTRAVENOUS at 09:07

## 2025-07-15 RX ADMIN — HEPARIN SODIUM 5000 UNITS: 5000 INJECTION INTRAVENOUS; SUBCUTANEOUS at 14:17

## 2025-07-15 RX ADMIN — ACETAMINOPHEN 1000 MG: 500 TABLET ORAL at 00:52

## 2025-07-15 RX ADMIN — AMOXICILLIN AND CLAVULANATE POTASSIUM 1 TABLET: 875; 125 TABLET, FILM COATED ORAL at 22:14

## 2025-07-15 ASSESSMENT — PAIN SCALES - GENERAL
PAINLEVEL_OUTOF10: 3
PAINLEVEL_OUTOF10: 0
PAINLEVEL_OUTOF10: 8
PAINLEVEL_OUTOF10: 4
PAINLEVEL_OUTOF10: 7
PAINLEVEL_OUTOF10: 7

## 2025-07-16 ENCOUNTER — APPOINTMENT (OUTPATIENT)
Dept: CARDIOLOGY | Age: 30
DRG: 092 | End: 2025-07-16
Attending: SPECIALIST

## 2025-07-16 LAB
ALBUMIN SERPL-MCNC: 3 G/DL (ref 3.4–5)
ALBUMIN SERPL-MCNC: 3 G/DL (ref 3.4–5)
ALBUMIN/GLOB SERPL: 0.8 {RATIO} (ref 1–2.4)
ALBUMIN/GLOB SERPL: 0.8 {RATIO} (ref 1–2.4)
ALP SERPL-CCNC: 62 UNITS/L (ref 45–117)
ALP SERPL-CCNC: 62 UNITS/L (ref 45–117)
ALT SERPL-CCNC: 26 UNITS/L
ALT SERPL-CCNC: 26 UNITS/L
ANION GAP SERPL CALC-SCNC: 14 MMOL/L (ref 7–19)
ANION GAP SERPL CALC-SCNC: 14 MMOL/L (ref 7–19)
AORTIC VALVE AREA (AVA): 0.69
AORTIC VALVE AREA (AVA): 0.69
APTT PPP: 27 SEC (ref 22–32)
APTT PPP: 27 SEC (ref 22–32)
ASCENDING AORTA (AAD): 3
ASCENDING AORTA (AAD): 3
AST SERPL-CCNC: 33 UNITS/L
AST SERPL-CCNC: 33 UNITS/L
AV STENOSIS SEVERITY TEXT: NORMAL
AV STENOSIS SEVERITY TEXT: NORMAL
AVI LVOT PEAK GRADIENT (LVOTMG): 1.2
AVI LVOT PEAK GRADIENT (LVOTMG): 1.2
BASOPHILS # BLD: 0 K/MCL (ref 0–0.3)
BASOPHILS # BLD: 0 K/MCL (ref 0–0.3)
BASOPHILS NFR BLD: 0 %
BASOPHILS NFR BLD: 0 %
BILIRUB SERPL-MCNC: 0.6 MG/DL (ref 0.2–1)
BILIRUB SERPL-MCNC: 0.6 MG/DL (ref 0.2–1)
BUN SERPL-MCNC: <5 MG/DL (ref 6–20)
BUN SERPL-MCNC: <5 MG/DL (ref 6–20)
BUN/CREAT SERPL: ABNORMAL
BUN/CREAT SERPL: ABNORMAL
CALCIUM SERPL-MCNC: 8.3 MG/DL (ref 8.4–10.2)
CALCIUM SERPL-MCNC: 8.3 MG/DL (ref 8.4–10.2)
CHLORIDE SERPL-SCNC: 98 MMOL/L (ref 97–110)
CHLORIDE SERPL-SCNC: 98 MMOL/L (ref 97–110)
CO2 SERPL-SCNC: 24 MMOL/L (ref 21–32)
CO2 SERPL-SCNC: 24 MMOL/L (ref 21–32)
CREAT SERPL-MCNC: 0.9 MG/DL (ref 0.67–1.17)
CREAT SERPL-MCNC: 0.9 MG/DL (ref 0.67–1.17)
DEPRECATED RDW RBC: 41.5 FL (ref 39–50)
DEPRECATED RDW RBC: 41.5 FL (ref 39–50)
E WAVE DECELARATION TIME (MDT): 7.37
E WAVE DECELARATION TIME (MDT): 7.37
EGFRCR SERPLBLD CKD-EPI 2021: >90 ML/MIN/{1.73_M2}
EGFRCR SERPLBLD CKD-EPI 2021: >90 ML/MIN/{1.73_M2}
EOSINOPHIL # BLD: 0 K/MCL (ref 0–0.5)
EOSINOPHIL # BLD: 0 K/MCL (ref 0–0.5)
EOSINOPHIL NFR BLD: 0 %
EOSINOPHIL NFR BLD: 0 %
ERYTHROCYTE [DISTWIDTH] IN BLOOD: 12.6 % (ref 11–15)
ERYTHROCYTE [DISTWIDTH] IN BLOOD: 12.6 % (ref 11–15)
FASTING DURATION TIME PATIENT: ABNORMAL H
FASTING DURATION TIME PATIENT: ABNORMAL H
GLOBULIN SER-MCNC: 3.9 G/DL (ref 2–4)
GLOBULIN SER-MCNC: 3.9 G/DL (ref 2–4)
GLUCOSE BLDC GLUCOMTR-MCNC: 168 MG/DL (ref 70–99)
GLUCOSE BLDC GLUCOMTR-MCNC: 168 MG/DL (ref 70–99)
GLUCOSE BLDC GLUCOMTR-MCNC: 261 MG/DL (ref 70–99)
GLUCOSE BLDC GLUCOMTR-MCNC: 261 MG/DL (ref 70–99)
GLUCOSE BLDC GLUCOMTR-MCNC: 277 MG/DL (ref 70–99)
GLUCOSE BLDC GLUCOMTR-MCNC: 277 MG/DL (ref 70–99)
GLUCOSE BLDC GLUCOMTR-MCNC: 344 MG/DL (ref 70–99)
GLUCOSE BLDC GLUCOMTR-MCNC: 344 MG/DL (ref 70–99)
GLUCOSE SERPL-MCNC: 186 MG/DL (ref 70–99)
GLUCOSE SERPL-MCNC: 186 MG/DL (ref 70–99)
HCT VFR BLD CALC: 42.9 % (ref 39–51)
HCT VFR BLD CALC: 42.9 % (ref 39–51)
HGB BLD-MCNC: 14.9 G/DL (ref 13–17)
HGB BLD-MCNC: 14.9 G/DL (ref 13–17)
IMM GRANULOCYTES # BLD AUTO: 0 K/MCL (ref 0–0.2)
IMM GRANULOCYTES # BLD AUTO: 0 K/MCL (ref 0–0.2)
IMM GRANULOCYTES # BLD: 0 %
IMM GRANULOCYTES # BLD: 0 %
INR PPP: 1
INR PPP: 1
INTERVENTRICULAR SEPTUM IN END DIASTOLE (IVSD): 2.52
INTERVENTRICULAR SEPTUM IN END DIASTOLE (IVSD): 2.52
LACTATE BLDV-SCNC: 1 MMOL/L (ref 0–2)
LACTATE BLDV-SCNC: 1 MMOL/L (ref 0–2)
LEFT INTERNAL DIMENSION IN SYSTOLE (LVSD): 1.3
LEFT INTERNAL DIMENSION IN SYSTOLE (LVSD): 1.3
LEFT VENTRICULAR INTERNAL DIMENSION IN DIASTOLE (LVDD): 2.8
LEFT VENTRICULAR INTERNAL DIMENSION IN DIASTOLE (LVDD): 2.8
LEFT VENTRICULAR POSTERIOR WALL IN END DIASTOLE (LVPW): 4.1
LEFT VENTRICULAR POSTERIOR WALL IN END DIASTOLE (LVPW): 4.1
LV EF 2D ECHO EST: NORMAL %
LV EF 2D ECHO EST: NORMAL %
LYMPHOCYTES # BLD: 0.5 K/MCL (ref 1–4.8)
LYMPHOCYTES # BLD: 0.5 K/MCL (ref 1–4.8)
LYMPHOCYTES NFR BLD: 7 %
LYMPHOCYTES NFR BLD: 7 %
MAGNESIUM SERPL-MCNC: 1.7 MG/DL (ref 1.7–2.4)
MAGNESIUM SERPL-MCNC: 1.7 MG/DL (ref 1.7–2.4)
MAGNESIUM SERPL-MCNC: 1.9 MG/DL (ref 1.7–2.4)
MCH RBC QN AUTO: 31 PG (ref 26–34)
MCH RBC QN AUTO: 31 PG (ref 26–34)
MCHC RBC AUTO-ENTMCNC: 34.7 G/DL (ref 32–36.5)
MCHC RBC AUTO-ENTMCNC: 34.7 G/DL (ref 32–36.5)
MCV RBC AUTO: 89.2 FL (ref 78–100)
MCV RBC AUTO: 89.2 FL (ref 78–100)
MONOCYTES # BLD: 0.4 K/MCL (ref 0.3–0.9)
MONOCYTES # BLD: 0.4 K/MCL (ref 0.3–0.9)
MONOCYTES NFR BLD: 4 %
MONOCYTES NFR BLD: 4 %
MV E TISSUE VEL MED (MESV): 10.9
MV E TISSUE VEL MED (MESV): 10.9
MV E WAVE VEL/E TISSUE VEL MED(MSR): 9.32
MV E WAVE VEL/E TISSUE VEL MED(MSR): 9.32
MV PEAK A VELOCITY (MVPAV): 160
MV PEAK A VELOCITY (MVPAV): 160
MV PEAK E VELOCITY (MVPEV): 0.67
MV PEAK E VELOCITY (MVPEV): 0.67
NEUTROPHILS # BLD: 7.3 K/MCL (ref 1.8–7.7)
NEUTROPHILS # BLD: 7.3 K/MCL (ref 1.8–7.7)
NEUTROPHILS NFR BLD: 89 %
NEUTROPHILS NFR BLD: 89 %
NRBC BLD MANUAL-RTO: 0 /100 WBC
NRBC BLD MANUAL-RTO: 0 /100 WBC
PHOSPHATE SERPL-MCNC: 2.3 MG/DL (ref 2.4–4.7)
PHOSPHATE SERPL-MCNC: 2.3 MG/DL (ref 2.4–4.7)
PHOSPHATE SERPL-MCNC: 2.4 MG/DL (ref 2.4–4.7)
PHOSPHATE SERPL-MCNC: 2.4 MG/DL (ref 2.4–4.7)
PHOSPHATE SERPL-MCNC: 3.3 MG/DL (ref 2.4–4.7)
PHOSPHATE SERPL-MCNC: 3.3 MG/DL (ref 2.4–4.7)
PLATELET # BLD AUTO: 234 K/MCL (ref 140–450)
PLATELET # BLD AUTO: 234 K/MCL (ref 140–450)
POTASSIUM SERPL-SCNC: 3.3 MMOL/L (ref 3.4–5.1)
POTASSIUM SERPL-SCNC: 3.3 MMOL/L (ref 3.4–5.1)
PROT SERPL-MCNC: 6.9 G/DL (ref 6.4–8.2)
PROT SERPL-MCNC: 6.9 G/DL (ref 6.4–8.2)
PROTHROMBIN TIME: 11.4 SEC (ref 9.7–11.8)
PROTHROMBIN TIME: 11.4 SEC (ref 9.7–11.8)
RBC # BLD: 4.81 MIL/MCL (ref 4.5–5.9)
RBC # BLD: 4.81 MIL/MCL (ref 4.5–5.9)
SODIUM SERPL-SCNC: 133 MMOL/L (ref 135–145)
SODIUM SERPL-SCNC: 133 MMOL/L (ref 135–145)
TRICUSPID VALVE PEAK REGURGITATION VELOCITY (TRPV): 2.8
TRICUSPID VALVE PEAK REGURGITATION VELOCITY (TRPV): 2.8
TV ESTIMATED RIGHT ARTERIAL PRESSURE (RAP): 20.8
TV ESTIMATED RIGHT ARTERIAL PRESSURE (RAP): 20.8
WBC # BLD: 8.2 K/MCL (ref 4.2–11)
WBC # BLD: 8.2 K/MCL (ref 4.2–11)

## 2025-07-16 PROCEDURE — 96372 THER/PROPH/DIAG INJ SC/IM: CPT

## 2025-07-16 PROCEDURE — 36415 COLL VENOUS BLD VENIPUNCTURE: CPT | Performed by: STUDENT IN AN ORGANIZED HEALTH CARE EDUCATION/TRAINING PROGRAM

## 2025-07-16 PROCEDURE — 10002803 HB RX 637: Performed by: INTERNAL MEDICINE

## 2025-07-16 PROCEDURE — 10006031 HB ROOM CHARGE TELEMETRY

## 2025-07-16 PROCEDURE — 10002803 HB RX 637

## 2025-07-16 PROCEDURE — 83605 ASSAY OF LACTIC ACID: CPT | Performed by: HOSPITALIST

## 2025-07-16 PROCEDURE — 10002800 HB RX 250 W HCPCS

## 2025-07-16 PROCEDURE — 10002800 HB RX 250 W HCPCS: Performed by: INTERNAL MEDICINE

## 2025-07-16 PROCEDURE — 10004651 HB RX, NO CHARGE ITEM

## 2025-07-16 PROCEDURE — 83735 ASSAY OF MAGNESIUM: CPT | Performed by: STUDENT IN AN ORGANIZED HEALTH CARE EDUCATION/TRAINING PROGRAM

## 2025-07-16 PROCEDURE — 84100 ASSAY OF PHOSPHORUS: CPT | Performed by: STUDENT IN AN ORGANIZED HEALTH CARE EDUCATION/TRAINING PROGRAM

## 2025-07-16 PROCEDURE — 85610 PROTHROMBIN TIME: CPT | Performed by: HOSPITALIST

## 2025-07-16 PROCEDURE — 10002803 HB RX 637: Performed by: STUDENT IN AN ORGANIZED HEALTH CARE EDUCATION/TRAINING PROGRAM

## 2025-07-16 PROCEDURE — A4216 STERILE WATER/SALINE, 10 ML: HCPCS

## 2025-07-16 PROCEDURE — A9150 MISC/EXPER NON-PRESCRIPT DRU: HCPCS

## 2025-07-16 PROCEDURE — 96372 THER/PROPH/DIAG INJ SC/IM: CPT | Performed by: SPECIALIST

## 2025-07-16 PROCEDURE — 80053 COMPREHEN METABOLIC PANEL: CPT | Performed by: HOSPITALIST

## 2025-07-16 PROCEDURE — 10002800 HB RX 250 W HCPCS: Performed by: SPECIALIST

## 2025-07-16 PROCEDURE — 93306 TTE W/DOPPLER COMPLETE: CPT

## 2025-07-16 PROCEDURE — 85730 THROMBOPLASTIN TIME PARTIAL: CPT | Performed by: HOSPITALIST

## 2025-07-16 PROCEDURE — 93306 TTE W/DOPPLER COMPLETE: CPT | Performed by: INTERNAL MEDICINE

## 2025-07-16 PROCEDURE — 85025 COMPLETE CBC W/AUTO DIFF WBC: CPT | Performed by: STUDENT IN AN ORGANIZED HEALTH CARE EDUCATION/TRAINING PROGRAM

## 2025-07-16 RX ORDER — HYDROCODONE BITARTRATE AND ACETAMINOPHEN 10; 325 MG/1; MG/1
1 TABLET ORAL EVERY 6 HOURS PRN
Refills: 0 | Status: DISCONTINUED | OUTPATIENT
Start: 2025-07-16 | End: 2025-07-22 | Stop reason: HOSPADM

## 2025-07-16 RX ORDER — ACETAMINOPHEN 325 MG/1
650 TABLET ORAL EVERY 6 HOURS PRN
Status: DISCONTINUED | OUTPATIENT
Start: 2025-07-16 | End: 2025-07-22 | Stop reason: HOSPADM

## 2025-07-16 RX ORDER — POTASSIUM CHLORIDE 1500 MG/1
40 TABLET, EXTENDED RELEASE ORAL ONCE
Status: COMPLETED | OUTPATIENT
Start: 2025-07-16 | End: 2025-07-16

## 2025-07-16 RX ORDER — METOPROLOL SUCCINATE 25 MG/1
25 TABLET, EXTENDED RELEASE ORAL DAILY
Status: DISCONTINUED | OUTPATIENT
Start: 2025-07-16 | End: 2025-07-18

## 2025-07-16 RX ORDER — METOPROLOL TARTRATE 1 MG/ML
5 INJECTION, SOLUTION INTRAVENOUS ONCE
Status: COMPLETED | OUTPATIENT
Start: 2025-07-16 | End: 2025-07-16

## 2025-07-16 RX ORDER — POLYETHYLENE GLYCOL 3350 17 G/17G
17 POWDER, FOR SOLUTION ORAL ONCE
Status: COMPLETED | OUTPATIENT
Start: 2025-07-16 | End: 2025-07-16

## 2025-07-16 RX ORDER — AMOXICILLIN 250 MG
2 CAPSULE ORAL NIGHTLY
Status: DISCONTINUED | OUTPATIENT
Start: 2025-07-16 | End: 2025-07-22 | Stop reason: HOSPADM

## 2025-07-16 RX ADMIN — HYDROMORPHONE HYDROCHLORIDE 0.5 MG: 1 INJECTION, SOLUTION INTRAMUSCULAR; INTRAVENOUS; SUBCUTANEOUS at 08:23

## 2025-07-16 RX ADMIN — INSULIN LISPRO 5 UNITS: 100 INJECTION, SOLUTION INTRAVENOUS; SUBCUTANEOUS at 16:54

## 2025-07-16 RX ADMIN — THIAMINE HYDROCHLORIDE 100 MG: 100 INJECTION, SOLUTION INTRAMUSCULAR; INTRAVENOUS at 08:23

## 2025-07-16 RX ADMIN — INSULIN LISPRO 5 UNITS: 100 INJECTION, SOLUTION INTRAVENOUS; SUBCUTANEOUS at 11:46

## 2025-07-16 RX ADMIN — QUETIAPINE FUMARATE 100 MG: 100 TABLET, FILM COATED ORAL at 08:23

## 2025-07-16 RX ADMIN — ACETAMINOPHEN 1000 MG: 500 TABLET ORAL at 21:33

## 2025-07-16 RX ADMIN — POTASSIUM CHLORIDE 40 MEQ: 1500 TABLET, EXTENDED RELEASE ORAL at 11:46

## 2025-07-16 RX ADMIN — HYDROMORPHONE HYDROCHLORIDE 0.5 MG: 1 INJECTION, SOLUTION INTRAMUSCULAR; INTRAVENOUS; SUBCUTANEOUS at 05:48

## 2025-07-16 RX ADMIN — ACETAMINOPHEN 1000 MG: 500 TABLET ORAL at 06:25

## 2025-07-16 RX ADMIN — CHLORHEXIDINE GLUCONATE 15 ML: 1.2 RINSE ORAL at 21:34

## 2025-07-16 RX ADMIN — QUETIAPINE FUMARATE 100 MG: 100 TABLET, FILM COATED ORAL at 21:33

## 2025-07-16 RX ADMIN — INSULIN LISPRO 5 UNITS: 100 INJECTION, SOLUTION INTRAVENOUS; SUBCUTANEOUS at 08:22

## 2025-07-16 RX ADMIN — SODIUM CHLORIDE, PRESERVATIVE FREE 2 ML: 5 INJECTION INTRAVENOUS at 21:35

## 2025-07-16 RX ADMIN — CHLORHEXIDINE GLUCONATE 15 ML: 1.2 RINSE ORAL at 08:23

## 2025-07-16 RX ADMIN — AMOXICILLIN AND CLAVULANATE POTASSIUM 1 TABLET: 875; 125 TABLET, FILM COATED ORAL at 08:23

## 2025-07-16 RX ADMIN — AMOXICILLIN AND CLAVULANATE POTASSIUM 1 TABLET: 875; 125 TABLET, FILM COATED ORAL at 21:33

## 2025-07-16 RX ADMIN — INSULIN GLARGINE 18 UNITS: 100 INJECTION, SOLUTION SUBCUTANEOUS at 21:33

## 2025-07-16 RX ADMIN — FOLIC ACID 1 MG: 5 INJECTION, SOLUTION INTRAMUSCULAR; INTRAVENOUS; SUBCUTANEOUS at 08:23

## 2025-07-16 RX ADMIN — METOPROLOL TARTRATE 5 MG: 1 INJECTION, SOLUTION INTRAVENOUS at 18:31

## 2025-07-16 RX ADMIN — HEPARIN SODIUM 5000 UNITS: 5000 INJECTION INTRAVENOUS; SUBCUTANEOUS at 15:09

## 2025-07-16 RX ADMIN — HYDROMORPHONE HYDROCHLORIDE 0.5 MG: 1 INJECTION, SOLUTION INTRAMUSCULAR; INTRAVENOUS; SUBCUTANEOUS at 17:53

## 2025-07-16 RX ADMIN — HEPARIN SODIUM 5000 UNITS: 5000 INJECTION INTRAVENOUS; SUBCUTANEOUS at 21:35

## 2025-07-16 RX ADMIN — POLYETHYLENE GLYCOL 3350 17 G: 17 POWDER, FOR SOLUTION ORAL at 11:47

## 2025-07-16 RX ADMIN — HYDROMORPHONE HYDROCHLORIDE 0.5 MG: 1 INJECTION, SOLUTION INTRAMUSCULAR; INTRAVENOUS; SUBCUTANEOUS at 15:09

## 2025-07-16 RX ADMIN — METOPROLOL SUCCINATE 25 MG: 25 TABLET, EXTENDED RELEASE ORAL at 21:34

## 2025-07-16 RX ADMIN — SODIUM CHLORIDE, PRESERVATIVE FREE 2 ML: 5 INJECTION INTRAVENOUS at 08:31

## 2025-07-16 ASSESSMENT — PAIN SCALES - GENERAL
PAINLEVEL_OUTOF10: 0
PAINLEVEL_OUTOF10: 8
PAINLEVEL_OUTOF10: 7
PAINLEVEL_OUTOF10: 8
PAINLEVEL_OUTOF10: 2
PAINLEVEL_OUTOF10: 7
PAINLEVEL_OUTOF10: 0
PAINLEVEL_OUTOF10: 8
PAINLEVEL_OUTOF10: 2

## 2025-07-17 ENCOUNTER — ANESTHESIA EVENT (OUTPATIENT)
Dept: SURGERY | Age: 30
End: 2025-07-17

## 2025-07-17 ENCOUNTER — ANESTHESIA (OUTPATIENT)
Dept: SURGERY | Age: 30
End: 2025-07-17

## 2025-07-17 LAB
BASOPHILS # BLD: 0 K/MCL (ref 0–0.3)
BASOPHILS # BLD: 0 K/MCL (ref 0–0.3)
BASOPHILS NFR BLD: 0 %
BASOPHILS NFR BLD: 0 %
DEPRECATED RDW RBC: 41.8 FL (ref 39–50)
DEPRECATED RDW RBC: 41.8 FL (ref 39–50)
EOSINOPHIL # BLD: 0 K/MCL (ref 0–0.5)
EOSINOPHIL # BLD: 0 K/MCL (ref 0–0.5)
EOSINOPHIL NFR BLD: 0 %
EOSINOPHIL NFR BLD: 0 %
ERYTHROCYTE [DISTWIDTH] IN BLOOD: 12.7 % (ref 11–15)
ERYTHROCYTE [DISTWIDTH] IN BLOOD: 12.7 % (ref 11–15)
GLUCOSE BLDC GLUCOMTR-MCNC: 160 MG/DL (ref 70–99)
GLUCOSE BLDC GLUCOMTR-MCNC: 160 MG/DL (ref 70–99)
GLUCOSE BLDC GLUCOMTR-MCNC: 235 MG/DL (ref 70–99)
GLUCOSE BLDC GLUCOMTR-MCNC: 235 MG/DL (ref 70–99)
GLUCOSE BLDC GLUCOMTR-MCNC: 278 MG/DL (ref 70–99)
GLUCOSE BLDC GLUCOMTR-MCNC: 304 MG/DL (ref 70–99)
GLUCOSE BLDC GLUCOMTR-MCNC: 304 MG/DL (ref 70–99)
HCT VFR BLD CALC: 45.8 % (ref 39–51)
HCT VFR BLD CALC: 45.8 % (ref 39–51)
HGB BLD-MCNC: 15.7 G/DL (ref 13–17)
HGB BLD-MCNC: 15.7 G/DL (ref 13–17)
IMM GRANULOCYTES # BLD AUTO: 0 K/MCL (ref 0–0.2)
IMM GRANULOCYTES # BLD AUTO: 0 K/MCL (ref 0–0.2)
IMM GRANULOCYTES # BLD: 0 %
IMM GRANULOCYTES # BLD: 0 %
LYMPHOCYTES # BLD: 1.1 K/MCL (ref 1–4.8)
LYMPHOCYTES # BLD: 1.1 K/MCL (ref 1–4.8)
LYMPHOCYTES NFR BLD: 23 %
LYMPHOCYTES NFR BLD: 23 %
MAGNESIUM SERPL-MCNC: 1.9 MG/DL (ref 1.7–2.4)
MAGNESIUM SERPL-MCNC: 2 MG/DL (ref 1.7–2.4)
MAGNESIUM SERPL-MCNC: 2 MG/DL (ref 1.7–2.4)
MCH RBC QN AUTO: 30.6 PG (ref 26–34)
MCH RBC QN AUTO: 30.6 PG (ref 26–34)
MCHC RBC AUTO-ENTMCNC: 34.3 G/DL (ref 32–36.5)
MCHC RBC AUTO-ENTMCNC: 34.3 G/DL (ref 32–36.5)
MCV RBC AUTO: 89.3 FL (ref 78–100)
MCV RBC AUTO: 89.3 FL (ref 78–100)
MONOCYTES # BLD: 0.6 K/MCL (ref 0.3–0.9)
MONOCYTES # BLD: 0.6 K/MCL (ref 0.3–0.9)
MONOCYTES NFR BLD: 12 %
MONOCYTES NFR BLD: 12 %
NEUTROPHILS # BLD: 3.2 K/MCL (ref 1.8–7.7)
NEUTROPHILS # BLD: 3.2 K/MCL (ref 1.8–7.7)
NEUTROPHILS NFR BLD: 65 %
NEUTROPHILS NFR BLD: 65 %
NRBC BLD MANUAL-RTO: 0 /100 WBC
NRBC BLD MANUAL-RTO: 0 /100 WBC
PHOSPHATE SERPL-MCNC: 3 MG/DL (ref 2.4–4.7)
PHOSPHATE SERPL-MCNC: 3 MG/DL (ref 2.4–4.7)
PHOSPHATE SERPL-MCNC: 3.1 MG/DL (ref 2.4–4.7)
PHOSPHATE SERPL-MCNC: 3.1 MG/DL (ref 2.4–4.7)
PHOSPHATE SERPL-MCNC: 3.5 MG/DL (ref 2.4–4.7)
PHOSPHATE SERPL-MCNC: 3.5 MG/DL (ref 2.4–4.7)
PLATELET # BLD AUTO: 275 K/MCL (ref 140–450)
PLATELET # BLD AUTO: 275 K/MCL (ref 140–450)
RBC # BLD: 5.13 MIL/MCL (ref 4.5–5.9)
RBC # BLD: 5.13 MIL/MCL (ref 4.5–5.9)
WBC # BLD: 5 K/MCL (ref 4.2–11)
WBC # BLD: 5 K/MCL (ref 4.2–11)

## 2025-07-17 PROCEDURE — 10002803 HB RX 637: Performed by: INTERNAL MEDICINE

## 2025-07-17 PROCEDURE — A9150 MISC/EXPER NON-PRESCRIPT DRU: HCPCS

## 2025-07-17 PROCEDURE — 84100 ASSAY OF PHOSPHORUS: CPT | Performed by: OTOLARYNGOLOGY

## 2025-07-17 PROCEDURE — 10002800 HB RX 250 W HCPCS: Performed by: OTOLARYNGOLOGY

## 2025-07-17 PROCEDURE — 10002800 HB RX 250 W HCPCS: Performed by: ANESTHESIOLOGY

## 2025-07-17 PROCEDURE — 10002800 HB RX 250 W HCPCS

## 2025-07-17 PROCEDURE — 10004452 HB PACU ADDL 30 MINUTES: Performed by: OTOLARYNGOLOGY

## 2025-07-17 PROCEDURE — A4216 STERILE WATER/SALINE, 10 ML: HCPCS | Performed by: OTOLARYNGOLOGY

## 2025-07-17 PROCEDURE — 10002800 HB RX 250 W HCPCS: Performed by: SPECIALIST

## 2025-07-17 PROCEDURE — 36415 COLL VENOUS BLD VENIPUNCTURE: CPT | Performed by: STUDENT IN AN ORGANIZED HEALTH CARE EDUCATION/TRAINING PROGRAM

## 2025-07-17 PROCEDURE — 10006031 HB ROOM CHARGE TELEMETRY

## 2025-07-17 PROCEDURE — 99233 SBSQ HOSP IP/OBS HIGH 50: CPT | Performed by: PSYCHIATRY & NEUROLOGY

## 2025-07-17 PROCEDURE — A4216 STERILE WATER/SALINE, 10 ML: HCPCS

## 2025-07-17 PROCEDURE — 83735 ASSAY OF MAGNESIUM: CPT | Performed by: STUDENT IN AN ORGANIZED HEALTH CARE EDUCATION/TRAINING PROGRAM

## 2025-07-17 PROCEDURE — 96372 THER/PROPH/DIAG INJ SC/IM: CPT

## 2025-07-17 PROCEDURE — 84100 ASSAY OF PHOSPHORUS: CPT | Performed by: STUDENT IN AN ORGANIZED HEALTH CARE EDUCATION/TRAINING PROGRAM

## 2025-07-17 PROCEDURE — 10004651 HB RX, NO CHARGE ITEM

## 2025-07-17 PROCEDURE — 10002803 HB RX 637: Performed by: OTOLARYNGOLOGY

## 2025-07-17 PROCEDURE — 10006023 HB SUPPLY 272: Performed by: OTOLARYNGOLOGY

## 2025-07-17 PROCEDURE — 82962 GLUCOSE BLOOD TEST: CPT

## 2025-07-17 PROCEDURE — 10002801 HB RX 250 W/O HCPCS: Performed by: OTOLARYNGOLOGY

## 2025-07-17 PROCEDURE — C1713 ANCHOR/SCREW BN/BN,TIS/BN: HCPCS | Performed by: OTOLARYNGOLOGY

## 2025-07-17 PROCEDURE — 96372 THER/PROPH/DIAG INJ SC/IM: CPT | Performed by: SPECIALIST

## 2025-07-17 PROCEDURE — 10004651 HB RX, NO CHARGE ITEM: Performed by: OTOLARYNGOLOGY

## 2025-07-17 PROCEDURE — 0NST04Z REPOSITION RIGHT MANDIBLE WITH INTERNAL FIXATION DEVICE, OPEN APPROACH: ICD-10-PCS | Performed by: OTOLARYNGOLOGY

## 2025-07-17 PROCEDURE — 83735 ASSAY OF MAGNESIUM: CPT | Performed by: OTOLARYNGOLOGY

## 2025-07-17 PROCEDURE — 13000003 HB ANESTHESIA  GENERAL EA ADD MINUTE: Performed by: OTOLARYNGOLOGY

## 2025-07-17 PROCEDURE — 13000037 HB COMPLEX CASE EACH ADD MINUTE: Performed by: OTOLARYNGOLOGY

## 2025-07-17 PROCEDURE — 10002803 HB RX 637: Performed by: STUDENT IN AN ORGANIZED HEALTH CARE EDUCATION/TRAINING PROGRAM

## 2025-07-17 PROCEDURE — 10004451 HB PACU RECOVERY 1ST 30 MINUTES: Performed by: OTOLARYNGOLOGY

## 2025-07-17 PROCEDURE — 10002803 HB RX 637

## 2025-07-17 PROCEDURE — 10002807 HB RX 258: Performed by: OTOLARYNGOLOGY

## 2025-07-17 PROCEDURE — 10002800 HB RX 250 W HCPCS: Performed by: INTERNAL MEDICINE

## 2025-07-17 PROCEDURE — 10002803 HB RX 637: Performed by: ANESTHESIOLOGY

## 2025-07-17 PROCEDURE — 10002803 HB RX 637: Performed by: PSYCHIATRY & NEUROLOGY

## 2025-07-17 PROCEDURE — 13000036 HB COMPLEX  CASE S/U + 1ST 15 MIN: Performed by: OTOLARYNGOLOGY

## 2025-07-17 PROCEDURE — 10002807 HB RX 258: Performed by: ANESTHESIOLOGY

## 2025-07-17 PROCEDURE — 96372 THER/PROPH/DIAG INJ SC/IM: CPT | Performed by: INTERNAL MEDICINE

## 2025-07-17 PROCEDURE — 0NSV04Z REPOSITION LEFT MANDIBLE WITH INTERNAL FIXATION DEVICE, OPEN APPROACH: ICD-10-PCS | Performed by: OTOLARYNGOLOGY

## 2025-07-17 PROCEDURE — 85025 COMPLETE CBC W/AUTO DIFF WBC: CPT | Performed by: STUDENT IN AN ORGANIZED HEALTH CARE EDUCATION/TRAINING PROGRAM

## 2025-07-17 PROCEDURE — 13000002 HB ANESTHESIA  GENERAL   S/U + 1ST 15 MIN: Performed by: OTOLARYNGOLOGY

## 2025-07-17 PROCEDURE — 10002801 HB RX 250 W/O HCPCS: Performed by: ANESTHESIOLOGY

## 2025-07-17 PROCEDURE — 10006027 HB SUPPLY 278: Performed by: OTOLARYNGOLOGY

## 2025-07-17 DEVICE — IMPLANTABLE DEVICE: Type: IMPLANTABLE DEVICE | Site: FACE | Status: FUNCTIONAL

## 2025-07-17 DEVICE — IMPLANTABLE DEVICE: Type: IMPLANTABLE DEVICE | Site: MOUTH | Status: FUNCTIONAL

## 2025-07-17 DEVICE — PLATE BN 4MMX1MM MNDB 4 HOLE BAR 22.3MM SCR MINI STRGT TI: Type: IMPLANTABLE DEVICE | Site: MOUTH | Status: FUNCTIONAL

## 2025-07-17 RX ORDER — METOCLOPRAMIDE HYDROCHLORIDE 5 MG/ML
5 INJECTION INTRAMUSCULAR; INTRAVENOUS EVERY 6 HOURS PRN
Status: DISCONTINUED | OUTPATIENT
Start: 2025-07-17 | End: 2025-07-17 | Stop reason: HOSPADM

## 2025-07-17 RX ORDER — DIPHENHYDRAMINE HYDROCHLORIDE 50 MG/ML
12.5 INJECTION, SOLUTION INTRAMUSCULAR; INTRAVENOUS
Status: DISCONTINUED | OUTPATIENT
Start: 2025-07-17 | End: 2025-07-17 | Stop reason: HOSPADM

## 2025-07-17 RX ORDER — SODIUM CHLORIDE 9 MG/ML
INJECTION, SOLUTION INTRAVENOUS CONTINUOUS PRN
Status: DISCONTINUED | OUTPATIENT
Start: 2025-07-17 | End: 2025-07-17

## 2025-07-17 RX ORDER — ONDANSETRON 2 MG/ML
4 INJECTION INTRAMUSCULAR; INTRAVENOUS 2 TIMES DAILY PRN
Status: DISCONTINUED | OUTPATIENT
Start: 2025-07-17 | End: 2025-07-17 | Stop reason: HOSPADM

## 2025-07-17 RX ORDER — METOPROLOL TARTRATE 1 MG/ML
INJECTION, SOLUTION INTRAVENOUS PRN
Status: DISCONTINUED | OUTPATIENT
Start: 2025-07-17 | End: 2025-07-17

## 2025-07-17 RX ORDER — PROPOFOL 10 MG/ML
INJECTION, EMULSION INTRAVENOUS PRN
Status: DISCONTINUED | OUTPATIENT
Start: 2025-07-17 | End: 2025-07-17

## 2025-07-17 RX ORDER — ACETAMINOPHEN 325 MG/1
650 TABLET ORAL EVERY 4 HOURS PRN
Status: DISCONTINUED | OUTPATIENT
Start: 2025-07-17 | End: 2025-07-17 | Stop reason: HOSPADM

## 2025-07-17 RX ORDER — CHLORHEXIDINE GLUCONATE ORAL RINSE 1.2 MG/ML
15 SOLUTION DENTAL 4 TIMES DAILY
Status: DISCONTINUED | OUTPATIENT
Start: 2025-07-17 | End: 2025-07-22 | Stop reason: HOSPADM

## 2025-07-17 RX ORDER — NALOXONE HCL 0.4 MG/ML
0.2 VIAL (ML) INJECTION EVERY 5 MIN PRN
Status: DISCONTINUED | OUTPATIENT
Start: 2025-07-17 | End: 2025-07-17 | Stop reason: HOSPADM

## 2025-07-17 RX ORDER — PRAZOSIN HYDROCHLORIDE 1 MG/1
1 CAPSULE ORAL NIGHTLY
Status: DISCONTINUED | OUTPATIENT
Start: 2025-07-17 | End: 2025-07-18

## 2025-07-17 RX ORDER — OXYMETAZOLINE HYDROCHLORIDE 0.05 G/100ML
SPRAY NASAL PRN
Status: DISCONTINUED | OUTPATIENT
Start: 2025-07-17 | End: 2025-07-17

## 2025-07-17 RX ORDER — DEXTROSE MONOHYDRATE 25 G/50ML
25 INJECTION, SOLUTION INTRAVENOUS PRN
Status: DISCONTINUED | OUTPATIENT
Start: 2025-07-17 | End: 2025-07-17 | Stop reason: HOSPADM

## 2025-07-17 RX ORDER — IBUPROFEN 600 MG/1
600 TABLET, FILM COATED ORAL EVERY 6 HOURS PRN
Status: DISCONTINUED | OUTPATIENT
Start: 2025-07-17 | End: 2025-07-18

## 2025-07-17 RX ORDER — ACETAMINOPHEN 650 MG/1
650 SUPPOSITORY RECTAL EVERY 4 HOURS PRN
Status: DISCONTINUED | OUTPATIENT
Start: 2025-07-17 | End: 2025-07-17 | Stop reason: HOSPADM

## 2025-07-17 RX ORDER — MAGNESIUM HYDROXIDE 1200 MG/15ML
LIQUID ORAL PRN
Status: DISCONTINUED | OUTPATIENT
Start: 2025-07-17 | End: 2025-07-17 | Stop reason: HOSPADM

## 2025-07-17 RX ORDER — LIDOCAINE HYDROCHLORIDE 20 MG/ML
INJECTION, SOLUTION INFILTRATION; PERINEURAL PRN
Status: DISCONTINUED | OUTPATIENT
Start: 2025-07-17 | End: 2025-07-17

## 2025-07-17 RX ORDER — DEXMEDETOMIDINE IN 0.9 % NACL 20 MCG/5ML
SYRINGE (ML) INTRAVENOUS PRN
Status: DISCONTINUED | OUTPATIENT
Start: 2025-07-17 | End: 2025-07-17

## 2025-07-17 RX ORDER — HYDRALAZINE HYDROCHLORIDE 20 MG/ML
5 INJECTION INTRAMUSCULAR; INTRAVENOUS EVERY 10 MIN PRN
Status: DISCONTINUED | OUTPATIENT
Start: 2025-07-17 | End: 2025-07-17 | Stop reason: HOSPADM

## 2025-07-17 RX ORDER — HALOPERIDOL 5 MG/ML
1 INJECTION INTRAMUSCULAR
Status: DISCONTINUED | OUTPATIENT
Start: 2025-07-17 | End: 2025-07-17 | Stop reason: HOSPADM

## 2025-07-17 RX ORDER — NICOTINE POLACRILEX 4 MG
30 LOZENGE BUCCAL
Status: DISCONTINUED | OUTPATIENT
Start: 2025-07-17 | End: 2025-07-17 | Stop reason: HOSPADM

## 2025-07-17 RX ORDER — FOLIC ACID 1 MG/1
1 TABLET ORAL DAILY
Status: DISCONTINUED | OUTPATIENT
Start: 2025-07-17 | End: 2025-07-22 | Stop reason: HOSPADM

## 2025-07-17 RX ORDER — ROCURONIUM BROMIDE 10 MG/ML
INJECTION, SOLUTION INTRAVENOUS PRN
Status: DISCONTINUED | OUTPATIENT
Start: 2025-07-17 | End: 2025-07-17

## 2025-07-17 RX ADMIN — SODIUM CHLORIDE, PRESERVATIVE FREE 2 ML: 5 INJECTION INTRAVENOUS at 08:59

## 2025-07-17 RX ADMIN — FENTANYL CITRATE 50 MCG: 50 INJECTION INTRAMUSCULAR; INTRAVENOUS at 13:50

## 2025-07-17 RX ADMIN — HYDROMORPHONE HYDROCHLORIDE 0.5 MG: 1 INJECTION, SOLUTION INTRAMUSCULAR; INTRAVENOUS; SUBCUTANEOUS at 20:36

## 2025-07-17 RX ADMIN — INSULIN GLARGINE 18 UNITS: 100 INJECTION, SOLUTION SUBCUTANEOUS at 20:38

## 2025-07-17 RX ADMIN — HYDROMORPHONE HYDROCHLORIDE 0.5 MG: 1 INJECTION, SOLUTION INTRAMUSCULAR; INTRAVENOUS; SUBCUTANEOUS at 14:45

## 2025-07-17 RX ADMIN — HYDROMORPHONE HYDROCHLORIDE 0.5 MG: 1 INJECTION, SOLUTION INTRAMUSCULAR; INTRAVENOUS; SUBCUTANEOUS at 23:53

## 2025-07-17 RX ADMIN — HYDROMORPHONE HYDROCHLORIDE 0.5 MG: 1 INJECTION, SOLUTION INTRAMUSCULAR; INTRAVENOUS; SUBCUTANEOUS at 08:47

## 2025-07-17 RX ADMIN — QUETIAPINE FUMARATE 50 MG: 25 TABLET, FILM COATED ORAL at 16:56

## 2025-07-17 RX ADMIN — ACETAMINOPHEN 1000 MG: 500 TABLET ORAL at 16:56

## 2025-07-17 RX ADMIN — OXYMETAZOLINE HYDROCHLORIDE 1 SPRAY: 0.05 SPRAY NASAL at 11:30

## 2025-07-17 RX ADMIN — CHLORHEXIDINE GLUCONATE 15 ML: 1.2 RINSE ORAL at 20:39

## 2025-07-17 RX ADMIN — HYDROMORPHONE HYDROCHLORIDE 0.5 MG: 1 INJECTION, SOLUTION INTRAMUSCULAR; INTRAVENOUS; SUBCUTANEOUS at 16:59

## 2025-07-17 RX ADMIN — HYDROMORPHONE HYDROCHLORIDE 0.4 MG: 1 INJECTION, SOLUTION INTRAMUSCULAR; INTRAVENOUS; SUBCUTANEOUS at 13:48

## 2025-07-17 RX ADMIN — LIDOCAINE HYDROCHLORIDE 5 ML: 20 INJECTION, SOLUTION INFILTRATION; PERINEURAL at 11:31

## 2025-07-17 RX ADMIN — METOROPROLOL TARTRATE 2 MG: 5 INJECTION, SOLUTION INTRAVENOUS at 12:36

## 2025-07-17 RX ADMIN — Medication 10 MCG: at 12:07

## 2025-07-17 RX ADMIN — INSULIN LISPRO 5 UNITS: 100 INJECTION, SOLUTION INTRAVENOUS; SUBCUTANEOUS at 16:59

## 2025-07-17 RX ADMIN — FENTANYL CITRATE 100 MCG: 50 INJECTION INTRAMUSCULAR; INTRAVENOUS at 11:31

## 2025-07-17 RX ADMIN — ROCURONIUM BROMIDE 100 MG: 10 INJECTION INTRAVENOUS at 11:31

## 2025-07-17 RX ADMIN — THIAMINE HYDROCHLORIDE 100 MG: 100 INJECTION, SOLUTION INTRAMUSCULAR; INTRAVENOUS at 08:54

## 2025-07-17 RX ADMIN — INSULIN LISPRO 2 UNITS: 100 INJECTION, SOLUTION INTRAVENOUS; SUBCUTANEOUS at 17:00

## 2025-07-17 RX ADMIN — FENTANYL CITRATE 50 MCG: 50 INJECTION INTRAMUSCULAR; INTRAVENOUS at 13:34

## 2025-07-17 RX ADMIN — INSULIN LISPRO 6 UNITS: 100 INJECTION, SOLUTION INTRAVENOUS; SUBCUTANEOUS at 13:50

## 2025-07-17 RX ADMIN — HYDROMORPHONE HYDROCHLORIDE 1 MG: 1 INJECTION, SOLUTION INTRAMUSCULAR; INTRAVENOUS; SUBCUTANEOUS at 12:05

## 2025-07-17 RX ADMIN — CHLORHEXIDINE GLUCONATE 15 ML: 1.2 RINSE ORAL at 16:58

## 2025-07-17 RX ADMIN — Medication 10 MCG: at 11:55

## 2025-07-17 RX ADMIN — Medication 10 MCG: at 11:31

## 2025-07-17 RX ADMIN — PROPOFOL 150 MG: 10 INJECTION, EMULSION INTRAVENOUS at 11:31

## 2025-07-17 RX ADMIN — ACETAMINOPHEN 1000 MG: 500 TABLET ORAL at 20:11

## 2025-07-17 RX ADMIN — AMPICILLIN SODIUM AND SULBACTAM SODIUM 3 G: 2; 1 INJECTION, POWDER, FOR SOLUTION INTRAMUSCULAR; INTRAVENOUS at 17:05

## 2025-07-17 RX ADMIN — ACETAMINOPHEN 1000 MG: 500 TABLET ORAL at 08:53

## 2025-07-17 RX ADMIN — HYDRALAZINE HYDROCHLORIDE 5 MG: 20 INJECTION INTRAMUSCULAR; INTRAVENOUS at 13:54

## 2025-07-17 RX ADMIN — HYDROMORPHONE HYDROCHLORIDE 0.2 MG: 1 INJECTION, SOLUTION INTRAMUSCULAR; INTRAVENOUS; SUBCUTANEOUS at 13:58

## 2025-07-17 RX ADMIN — HEPARIN SODIUM 5000 UNITS: 5000 INJECTION INTRAVENOUS; SUBCUTANEOUS at 06:13

## 2025-07-17 RX ADMIN — QUETIAPINE FUMARATE 100 MG: 100 TABLET, FILM COATED ORAL at 08:53

## 2025-07-17 RX ADMIN — INSULIN HUMAN 5 UNITS: 100 INJECTION, SOLUTION PARENTERAL at 12:56

## 2025-07-17 RX ADMIN — AMPICILLIN SODIUM AND SULBACTAM SODIUM 3 G: 2; 1 INJECTION, POWDER, FOR SOLUTION INTRAMUSCULAR; INTRAVENOUS at 23:18

## 2025-07-17 RX ADMIN — QUETIAPINE FUMARATE 100 MG: 100 TABLET, FILM COATED ORAL at 20:12

## 2025-07-17 RX ADMIN — CHLORHEXIDINE GLUCONATE 15 ML: 1.2 RINSE ORAL at 08:47

## 2025-07-17 RX ADMIN — SODIUM CHLORIDE 3 G: 900 INJECTION INTRAVENOUS at 11:40

## 2025-07-17 RX ADMIN — PRAZOSIN HYDROCHLORIDE 1 MG: 1 CAPSULE ORAL at 22:15

## 2025-07-17 RX ADMIN — SODIUM CHLORIDE: 9 INJECTION, SOLUTION INTRAVENOUS at 11:20

## 2025-07-17 RX ADMIN — FOLIC ACID 1 MG: 1 TABLET ORAL at 16:56

## 2025-07-17 RX ADMIN — HEPARIN SODIUM 5000 UNITS: 5000 INJECTION INTRAVENOUS; SUBCUTANEOUS at 22:15

## 2025-07-17 RX ADMIN — HYDROMORPHONE HYDROCHLORIDE 0.4 MG: 1 INJECTION, SOLUTION INTRAMUSCULAR; INTRAVENOUS; SUBCUTANEOUS at 13:35

## 2025-07-17 RX ADMIN — METOROPROLOL TARTRATE 3 MG: 5 INJECTION, SOLUTION INTRAVENOUS at 12:42

## 2025-07-17 RX ADMIN — SODIUM CHLORIDE: 9 INJECTION, SOLUTION INTRAVENOUS at 12:50

## 2025-07-17 RX ADMIN — Medication 10 MCG: at 12:23

## 2025-07-17 RX ADMIN — SODIUM CHLORIDE, PRESERVATIVE FREE 2 ML: 5 INJECTION INTRAVENOUS at 20:15

## 2025-07-17 ASSESSMENT — PAIN SCALES - GENERAL
PAINLEVEL_OUTOF10: 10
PAINLEVEL_OUTOF10: 2
PAINLEVEL_OUTOF10: 2
PAINLEVEL_OUTOF10: 3
PAINLEVEL_OUTOF10: 10
PAINLEVEL_OUTOF10: 2
PAINLEVEL_OUTOF10: 8
PAINLEVEL_OUTOF10: 8
PAINLEVEL_OUTOF10: 2
PAINLEVEL_OUTOF10: 7
PAINLEVEL_OUTOF10: 5
PAINLEVEL_OUTOF10: 10

## 2025-07-17 ASSESSMENT — ENCOUNTER SYMPTOMS: EXERCISE TOLERANCE: GOOD (>4 METS)

## 2025-07-18 LAB
BASOPHILS # BLD: 0 K/MCL (ref 0–0.3)
BASOPHILS # BLD: 0 K/MCL (ref 0–0.3)
BASOPHILS NFR BLD: 0 %
BASOPHILS NFR BLD: 0 %
D DIMER PPP FEU-MCNC: 0.89 MG/L (FEU)
D DIMER PPP FEU-MCNC: 0.89 MG/L (FEU)
DEPRECATED RDW RBC: 42.8 FL (ref 39–50)
DEPRECATED RDW RBC: 42.8 FL (ref 39–50)
EOSINOPHIL # BLD: 0 K/MCL (ref 0–0.5)
EOSINOPHIL # BLD: 0 K/MCL (ref 0–0.5)
EOSINOPHIL NFR BLD: 0 %
EOSINOPHIL NFR BLD: 0 %
ERYTHROCYTE [DISTWIDTH] IN BLOOD: 12.5 % (ref 11–15)
ERYTHROCYTE [DISTWIDTH] IN BLOOD: 12.5 % (ref 11–15)
GLUCOSE BLDC GLUCOMTR-MCNC: 256 MG/DL (ref 70–99)
GLUCOSE BLDC GLUCOMTR-MCNC: 256 MG/DL (ref 70–99)
GLUCOSE BLDC GLUCOMTR-MCNC: 264 MG/DL (ref 70–99)
GLUCOSE BLDC GLUCOMTR-MCNC: 264 MG/DL (ref 70–99)
GLUCOSE BLDC GLUCOMTR-MCNC: 271 MG/DL (ref 70–99)
GLUCOSE BLDC GLUCOMTR-MCNC: 271 MG/DL (ref 70–99)
GLUCOSE BLDC GLUCOMTR-MCNC: 293 MG/DL (ref 70–99)
GLUCOSE BLDC GLUCOMTR-MCNC: 293 MG/DL (ref 70–99)
HCT VFR BLD CALC: 44.5 % (ref 39–51)
HCT VFR BLD CALC: 44.5 % (ref 39–51)
HGB BLD-MCNC: 15.1 G/DL (ref 13–17)
HGB BLD-MCNC: 15.1 G/DL (ref 13–17)
IMM GRANULOCYTES # BLD AUTO: 0.1 K/MCL (ref 0–0.2)
IMM GRANULOCYTES # BLD AUTO: 0.1 K/MCL (ref 0–0.2)
IMM GRANULOCYTES # BLD: 1 %
IMM GRANULOCYTES # BLD: 1 %
LYMPHOCYTES # BLD: 0.7 K/MCL (ref 1–4.8)
LYMPHOCYTES # BLD: 0.7 K/MCL (ref 1–4.8)
LYMPHOCYTES NFR BLD: 6 %
LYMPHOCYTES NFR BLD: 6 %
MAGNESIUM SERPL-MCNC: 1.5 MG/DL (ref 1.7–2.4)
MAGNESIUM SERPL-MCNC: 1.5 MG/DL (ref 1.7–2.4)
MAGNESIUM SERPL-MCNC: 1.8 MG/DL (ref 1.7–2.4)
MAGNESIUM SERPL-MCNC: 1.8 MG/DL (ref 1.7–2.4)
MAGNESIUM SERPL-MCNC: 1.9 MG/DL (ref 1.7–2.4)
MAGNESIUM SERPL-MCNC: 1.9 MG/DL (ref 1.7–2.4)
MCH RBC QN AUTO: 31.4 PG (ref 26–34)
MCH RBC QN AUTO: 31.4 PG (ref 26–34)
MCHC RBC AUTO-ENTMCNC: 33.9 G/DL (ref 32–36.5)
MCHC RBC AUTO-ENTMCNC: 33.9 G/DL (ref 32–36.5)
MCV RBC AUTO: 92.5 FL (ref 78–100)
MCV RBC AUTO: 92.5 FL (ref 78–100)
MONOCYTES # BLD: 0.8 K/MCL (ref 0.3–0.9)
MONOCYTES # BLD: 0.8 K/MCL (ref 0.3–0.9)
MONOCYTES NFR BLD: 7 %
MONOCYTES NFR BLD: 7 %
NEUTROPHILS # BLD: 10.1 K/MCL (ref 1.8–7.7)
NEUTROPHILS # BLD: 10.1 K/MCL (ref 1.8–7.7)
NEUTROPHILS NFR BLD: 86 %
NEUTROPHILS NFR BLD: 86 %
NRBC BLD MANUAL-RTO: 0 /100 WBC
NRBC BLD MANUAL-RTO: 0 /100 WBC
PHOSPHATE SERPL-MCNC: 2.3 MG/DL (ref 2.4–4.7)
PHOSPHATE SERPL-MCNC: 2.3 MG/DL (ref 2.4–4.7)
PHOSPHATE SERPL-MCNC: 3 MG/DL (ref 2.4–4.7)
PHOSPHATE SERPL-MCNC: 3 MG/DL (ref 2.4–4.7)
PHOSPHATE SERPL-MCNC: 3.5 MG/DL (ref 2.4–4.7)
PHOSPHATE SERPL-MCNC: 3.5 MG/DL (ref 2.4–4.7)
PLATELET # BLD AUTO: 286 K/MCL (ref 140–450)
PLATELET # BLD AUTO: 286 K/MCL (ref 140–450)
RBC # BLD: 4.81 MIL/MCL (ref 4.5–5.9)
RBC # BLD: 4.81 MIL/MCL (ref 4.5–5.9)
WBC # BLD: 11.7 K/MCL (ref 4.2–11)
WBC # BLD: 11.7 K/MCL (ref 4.2–11)

## 2025-07-18 PROCEDURE — 10002800 HB RX 250 W HCPCS

## 2025-07-18 PROCEDURE — A4216 STERILE WATER/SALINE, 10 ML: HCPCS | Performed by: OTOLARYNGOLOGY

## 2025-07-18 PROCEDURE — 10004651 HB RX, NO CHARGE ITEM: Performed by: OTOLARYNGOLOGY

## 2025-07-18 PROCEDURE — 10002800 HB RX 250 W HCPCS: Performed by: OTOLARYNGOLOGY

## 2025-07-18 PROCEDURE — 85025 COMPLETE CBC W/AUTO DIFF WBC: CPT | Performed by: OTOLARYNGOLOGY

## 2025-07-18 PROCEDURE — 10002807 HB RX 258: Performed by: INTERNAL MEDICINE

## 2025-07-18 PROCEDURE — 96372 THER/PROPH/DIAG INJ SC/IM: CPT

## 2025-07-18 PROCEDURE — 83735 ASSAY OF MAGNESIUM: CPT | Performed by: OTOLARYNGOLOGY

## 2025-07-18 PROCEDURE — 10002803 HB RX 637: Performed by: INTERNAL MEDICINE

## 2025-07-18 PROCEDURE — A9150 MISC/EXPER NON-PRESCRIPT DRU: HCPCS

## 2025-07-18 PROCEDURE — 85379 FIBRIN DEGRADATION QUANT: CPT | Performed by: INTERNAL MEDICINE

## 2025-07-18 PROCEDURE — 13003287

## 2025-07-18 PROCEDURE — 87040 BLOOD CULTURE FOR BACTERIA: CPT | Performed by: INTERNAL MEDICINE

## 2025-07-18 PROCEDURE — 10002807 HB RX 258: Performed by: OTOLARYNGOLOGY

## 2025-07-18 PROCEDURE — 10002803 HB RX 637

## 2025-07-18 PROCEDURE — 10002803 HB RX 637: Performed by: HOSPITALIST

## 2025-07-18 PROCEDURE — 36415 COLL VENOUS BLD VENIPUNCTURE: CPT | Performed by: OTOLARYNGOLOGY

## 2025-07-18 PROCEDURE — 10002803 HB RX 637: Performed by: OTOLARYNGOLOGY

## 2025-07-18 PROCEDURE — A9150 MISC/EXPER NON-PRESCRIPT DRU: HCPCS | Performed by: HOSPITALIST

## 2025-07-18 PROCEDURE — 96372 THER/PROPH/DIAG INJ SC/IM: CPT | Performed by: INTERNAL MEDICINE

## 2025-07-18 PROCEDURE — 84100 ASSAY OF PHOSPHORUS: CPT | Performed by: OTOLARYNGOLOGY

## 2025-07-18 PROCEDURE — 10004281 HB COUNTER-STAFF TIME PER 15 MIN

## 2025-07-18 PROCEDURE — 10006031 HB ROOM CHARGE TELEMETRY

## 2025-07-18 PROCEDURE — 10004651 HB RX, NO CHARGE ITEM

## 2025-07-18 PROCEDURE — 10002800 HB RX 250 W HCPCS: Performed by: INTERNAL MEDICINE

## 2025-07-18 RX ORDER — LITHIUM CARBONATE 300 MG/1
300 CAPSULE ORAL 2 TIMES DAILY WITH MEALS
Status: DISCONTINUED | OUTPATIENT
Start: 2025-07-18 | End: 2025-07-22 | Stop reason: HOSPADM

## 2025-07-18 RX ORDER — INSULIN GLARGINE 100 [IU]/ML
20 INJECTION, SOLUTION SUBCUTANEOUS NIGHTLY
Status: DISCONTINUED | OUTPATIENT
Start: 2025-07-18 | End: 2025-07-19 | Stop reason: DRUGHIGH

## 2025-07-18 RX ORDER — METOPROLOL SUCCINATE 50 MG/1
50 TABLET, EXTENDED RELEASE ORAL DAILY
Status: DISCONTINUED | OUTPATIENT
Start: 2025-07-18 | End: 2025-07-21

## 2025-07-18 RX ADMIN — ACETAMINOPHEN 1000 MG: 500 TABLET ORAL at 17:38

## 2025-07-18 RX ADMIN — CHLORHEXIDINE GLUCONATE 15 ML: 1.2 RINSE ORAL at 08:00

## 2025-07-18 RX ADMIN — AMPICILLIN SODIUM AND SULBACTAM SODIUM 3 G: 2; 1 INJECTION, POWDER, FOR SOLUTION INTRAMUSCULAR; INTRAVENOUS at 05:05

## 2025-07-18 RX ADMIN — QUETIAPINE FUMARATE 100 MG: 100 TABLET, FILM COATED ORAL at 21:34

## 2025-07-18 RX ADMIN — SODIUM CHLORIDE, POTASSIUM CHLORIDE, SODIUM LACTATE AND CALCIUM CHLORIDE 1000 ML: 600; 310; 30; 20 INJECTION, SOLUTION INTRAVENOUS at 02:58

## 2025-07-18 RX ADMIN — CHLORHEXIDINE GLUCONATE 15 ML: 1.2 RINSE ORAL at 12:00

## 2025-07-18 RX ADMIN — OXYCODONE HYDROCHLORIDE 10 MG: 5 TABLET ORAL at 14:18

## 2025-07-18 RX ADMIN — INSULIN LISPRO 5 UNITS: 100 INJECTION, SOLUTION INTRAVENOUS; SUBCUTANEOUS at 17:38

## 2025-07-18 RX ADMIN — AMPICILLIN SODIUM AND SULBACTAM SODIUM 3 G: 2; 1 INJECTION, POWDER, FOR SOLUTION INTRAMUSCULAR; INTRAVENOUS at 11:39

## 2025-07-18 RX ADMIN — HYDROMORPHONE HYDROCHLORIDE 0.5 MG: 1 INJECTION, SOLUTION INTRAMUSCULAR; INTRAVENOUS; SUBCUTANEOUS at 11:40

## 2025-07-18 RX ADMIN — HEPARIN SODIUM 5000 UNITS: 5000 INJECTION INTRAVENOUS; SUBCUTANEOUS at 21:34

## 2025-07-18 RX ADMIN — HYDROMORPHONE HYDROCHLORIDE 0.5 MG: 1 INJECTION, SOLUTION INTRAMUSCULAR; INTRAVENOUS; SUBCUTANEOUS at 14:19

## 2025-07-18 RX ADMIN — HYDROMORPHONE HYDROCHLORIDE 0.5 MG: 1 INJECTION, SOLUTION INTRAMUSCULAR; INTRAVENOUS; SUBCUTANEOUS at 21:45

## 2025-07-18 RX ADMIN — ACETAMINOPHEN 1000 MG: 500 TABLET ORAL at 09:24

## 2025-07-18 RX ADMIN — SODIUM CHLORIDE, PRESERVATIVE FREE 2 ML: 5 INJECTION INTRAVENOUS at 21:37

## 2025-07-18 RX ADMIN — ACETAMINOPHEN 1000 MG: 500 TABLET ORAL at 21:34

## 2025-07-18 RX ADMIN — LITHIUM CARBONATE 300 MG: 300 CAPSULE, GELATIN COATED ORAL at 17:37

## 2025-07-18 RX ADMIN — QUETIAPINE FUMARATE 100 MG: 100 TABLET, FILM COATED ORAL at 08:00

## 2025-07-18 RX ADMIN — OXYCODONE HYDROCHLORIDE 10 MG: 5 TABLET ORAL at 05:01

## 2025-07-18 RX ADMIN — INSULIN GLARGINE 20 UNITS: 100 INJECTION, SOLUTION SUBCUTANEOUS at 21:55

## 2025-07-18 RX ADMIN — INSULIN LISPRO 5 UNITS: 100 INJECTION, SOLUTION INTRAVENOUS; SUBCUTANEOUS at 08:00

## 2025-07-18 RX ADMIN — METOPROLOL SUCCINATE 50 MG: 50 TABLET, EXTENDED RELEASE ORAL at 08:00

## 2025-07-18 RX ADMIN — HYDROMORPHONE HYDROCHLORIDE 0.5 MG: 1 INJECTION, SOLUTION INTRAMUSCULAR; INTRAVENOUS; SUBCUTANEOUS at 07:55

## 2025-07-18 RX ADMIN — HYDROMORPHONE HYDROCHLORIDE 0.5 MG: 1 INJECTION, SOLUTION INTRAMUSCULAR; INTRAVENOUS; SUBCUTANEOUS at 02:45

## 2025-07-18 RX ADMIN — INSULIN LISPRO 4 UNITS: 100 INJECTION, SOLUTION INTRAVENOUS; SUBCUTANEOUS at 17:38

## 2025-07-18 RX ADMIN — INSULIN LISPRO 3 UNITS: 100 INJECTION, SOLUTION INTRAVENOUS; SUBCUTANEOUS at 05:41

## 2025-07-18 RX ADMIN — HEPARIN SODIUM 5000 UNITS: 5000 INJECTION INTRAVENOUS; SUBCUTANEOUS at 05:13

## 2025-07-18 RX ADMIN — INSULIN LISPRO 4 UNITS: 100 INJECTION, SOLUTION INTRAVENOUS; SUBCUTANEOUS at 11:32

## 2025-07-18 RX ADMIN — FOLIC ACID 1 MG: 1 TABLET ORAL at 08:00

## 2025-07-18 RX ADMIN — AMOXICILLIN AND CLAVULANATE POTASSIUM 1 TABLET: 875; 125 TABLET, FILM COATED ORAL at 17:37

## 2025-07-18 RX ADMIN — ACETAMINOPHEN 650 MG: 325 TABLET ORAL at 02:25

## 2025-07-18 RX ADMIN — CHLORHEXIDINE GLUCONATE 15 ML: 1.2 RINSE ORAL at 21:33

## 2025-07-18 RX ADMIN — SODIUM CHLORIDE, PRESERVATIVE FREE 2 ML: 5 INJECTION INTRAVENOUS at 08:00

## 2025-07-18 RX ADMIN — CHLORHEXIDINE GLUCONATE 15 ML: 1.2 RINSE ORAL at 17:37

## 2025-07-18 RX ADMIN — Medication 100 MG: at 08:00

## 2025-07-18 RX ADMIN — HYDROMORPHONE HYDROCHLORIDE 0.5 MG: 1 INJECTION, SOLUTION INTRAMUSCULAR; INTRAVENOUS; SUBCUTANEOUS at 17:39

## 2025-07-18 RX ADMIN — INSULIN LISPRO 5 UNITS: 100 INJECTION, SOLUTION INTRAVENOUS; SUBCUTANEOUS at 11:31

## 2025-07-18 RX ADMIN — HEPARIN SODIUM 5000 UNITS: 5000 INJECTION INTRAVENOUS; SUBCUTANEOUS at 14:21

## 2025-07-18 ASSESSMENT — PAIN SCALES - GENERAL
PAINLEVEL_OUTOF10: 3
PAINLEVEL_OUTOF10: 7
PAINLEVEL_OUTOF10: 8
PAINLEVEL_OUTOF10: 8
PAINLEVEL_OUTOF10: 6
PAINLEVEL_OUTOF10: 7
PAINLEVEL_OUTOF10: 0
PAINLEVEL_OUTOF10: 10
PAINLEVEL_OUTOF10: 8
PAINLEVEL_OUTOF10: 2

## 2025-07-18 ASSESSMENT — PAIN SCALES - WONG BAKER: WONGBAKER_NUMERICALRESPONSE: 10

## 2025-07-19 LAB
ANION GAP SERPL CALC-SCNC: 14 MMOL/L (ref 7–19)
ANION GAP SERPL CALC-SCNC: 14 MMOL/L (ref 7–19)
BASOPHILS # BLD: 0 K/MCL (ref 0–0.3)
BASOPHILS # BLD: 0 K/MCL (ref 0–0.3)
BASOPHILS NFR BLD: 0 %
BASOPHILS NFR BLD: 0 %
BUN SERPL-MCNC: 7 MG/DL (ref 6–20)
BUN SERPL-MCNC: 7 MG/DL (ref 6–20)
BUN/CREAT SERPL: 8 (ref 7–25)
BUN/CREAT SERPL: 8 (ref 7–25)
CALCIUM SERPL-MCNC: 9.7 MG/DL (ref 8.4–10.2)
CALCIUM SERPL-MCNC: 9.7 MG/DL (ref 8.4–10.2)
CHLORIDE SERPL-SCNC: 96 MMOL/L (ref 97–110)
CHLORIDE SERPL-SCNC: 96 MMOL/L (ref 97–110)
CO2 SERPL-SCNC: 24 MMOL/L (ref 21–32)
CO2 SERPL-SCNC: 24 MMOL/L (ref 21–32)
CREAT SERPL-MCNC: 0.86 MG/DL (ref 0.67–1.17)
CREAT SERPL-MCNC: 0.86 MG/DL (ref 0.67–1.17)
DEPRECATED RDW RBC: 40.1 FL (ref 39–50)
DEPRECATED RDW RBC: 40.1 FL (ref 39–50)
EGFRCR SERPLBLD CKD-EPI 2021: >90 ML/MIN/{1.73_M2}
EGFRCR SERPLBLD CKD-EPI 2021: >90 ML/MIN/{1.73_M2}
EOSINOPHIL # BLD: 0 K/MCL (ref 0–0.5)
EOSINOPHIL # BLD: 0 K/MCL (ref 0–0.5)
EOSINOPHIL NFR BLD: 0 %
EOSINOPHIL NFR BLD: 0 %
ERYTHROCYTE [DISTWIDTH] IN BLOOD: 12.6 % (ref 11–15)
ERYTHROCYTE [DISTWIDTH] IN BLOOD: 12.6 % (ref 11–15)
FASTING DURATION TIME PATIENT: ABNORMAL H
FASTING DURATION TIME PATIENT: ABNORMAL H
GLUCOSE BLDC GLUCOMTR-MCNC: 161 MG/DL (ref 70–99)
GLUCOSE BLDC GLUCOMTR-MCNC: 161 MG/DL (ref 70–99)
GLUCOSE BLDC GLUCOMTR-MCNC: 251 MG/DL (ref 70–99)
GLUCOSE BLDC GLUCOMTR-MCNC: 251 MG/DL (ref 70–99)
GLUCOSE BLDC GLUCOMTR-MCNC: 291 MG/DL (ref 70–99)
GLUCOSE BLDC GLUCOMTR-MCNC: 291 MG/DL (ref 70–99)
GLUCOSE BLDC GLUCOMTR-MCNC: 327 MG/DL (ref 70–99)
GLUCOSE BLDC GLUCOMTR-MCNC: 327 MG/DL (ref 70–99)
GLUCOSE SERPL-MCNC: 243 MG/DL (ref 70–99)
GLUCOSE SERPL-MCNC: 243 MG/DL (ref 70–99)
HCT VFR BLD CALC: 44.9 % (ref 39–51)
HCT VFR BLD CALC: 44.9 % (ref 39–51)
HGB BLD-MCNC: 16.2 G/DL (ref 13–17)
HGB BLD-MCNC: 16.2 G/DL (ref 13–17)
IMM GRANULOCYTES # BLD AUTO: 0 K/MCL (ref 0–0.2)
IMM GRANULOCYTES # BLD AUTO: 0 K/MCL (ref 0–0.2)
IMM GRANULOCYTES # BLD: 0 %
IMM GRANULOCYTES # BLD: 0 %
LYMPHOCYTES # BLD: 1.7 K/MCL (ref 1–4.8)
LYMPHOCYTES # BLD: 1.7 K/MCL (ref 1–4.8)
LYMPHOCYTES NFR BLD: 20 %
LYMPHOCYTES NFR BLD: 20 %
MAGNESIUM SERPL-MCNC: 1.8 MG/DL (ref 1.7–2.4)
MAGNESIUM SERPL-MCNC: 1.8 MG/DL (ref 1.7–2.4)
MAGNESIUM SERPL-MCNC: 1.9 MG/DL (ref 1.7–2.4)
MCH RBC QN AUTO: 31.5 PG (ref 26–34)
MCH RBC QN AUTO: 31.5 PG (ref 26–34)
MCHC RBC AUTO-ENTMCNC: 36.1 G/DL (ref 32–36.5)
MCHC RBC AUTO-ENTMCNC: 36.1 G/DL (ref 32–36.5)
MCV RBC AUTO: 87.2 FL (ref 78–100)
MCV RBC AUTO: 87.2 FL (ref 78–100)
MONOCYTES # BLD: 0.9 K/MCL (ref 0.3–0.9)
MONOCYTES # BLD: 0.9 K/MCL (ref 0.3–0.9)
MONOCYTES NFR BLD: 11 %
MONOCYTES NFR BLD: 11 %
NEUTROPHILS # BLD: 5.8 K/MCL (ref 1.8–7.7)
NEUTROPHILS # BLD: 5.8 K/MCL (ref 1.8–7.7)
NEUTROPHILS NFR BLD: 69 %
NEUTROPHILS NFR BLD: 69 %
NRBC BLD MANUAL-RTO: 0 /100 WBC
NRBC BLD MANUAL-RTO: 0 /100 WBC
PHOSPHATE SERPL-MCNC: 2.8 MG/DL (ref 2.4–4.7)
PHOSPHATE SERPL-MCNC: 2.8 MG/DL (ref 2.4–4.7)
PHOSPHATE SERPL-MCNC: 3.2 MG/DL (ref 2.4–4.7)
PLATELET # BLD AUTO: 297 K/MCL (ref 140–450)
PLATELET # BLD AUTO: 297 K/MCL (ref 140–450)
POTASSIUM SERPL-SCNC: 3.9 MMOL/L (ref 3.4–5.1)
POTASSIUM SERPL-SCNC: 3.9 MMOL/L (ref 3.4–5.1)
RBC # BLD: 5.15 MIL/MCL (ref 4.5–5.9)
RBC # BLD: 5.15 MIL/MCL (ref 4.5–5.9)
SODIUM SERPL-SCNC: 130 MMOL/L (ref 135–145)
SODIUM SERPL-SCNC: 130 MMOL/L (ref 135–145)
WBC # BLD: 8.5 K/MCL (ref 4.2–11)
WBC # BLD: 8.5 K/MCL (ref 4.2–11)

## 2025-07-19 PROCEDURE — 10002803 HB RX 637: Performed by: INTERNAL MEDICINE

## 2025-07-19 PROCEDURE — 83735 ASSAY OF MAGNESIUM: CPT | Performed by: OTOLARYNGOLOGY

## 2025-07-19 PROCEDURE — 10002803 HB RX 637

## 2025-07-19 PROCEDURE — A4216 STERILE WATER/SALINE, 10 ML: HCPCS | Performed by: OTOLARYNGOLOGY

## 2025-07-19 PROCEDURE — 10006031 HB ROOM CHARGE TELEMETRY

## 2025-07-19 PROCEDURE — 10004651 HB RX, NO CHARGE ITEM: Performed by: OTOLARYNGOLOGY

## 2025-07-19 PROCEDURE — 10002803 HB RX 637: Performed by: OTOLARYNGOLOGY

## 2025-07-19 PROCEDURE — 36415 COLL VENOUS BLD VENIPUNCTURE: CPT | Performed by: OTOLARYNGOLOGY

## 2025-07-19 PROCEDURE — 84100 ASSAY OF PHOSPHORUS: CPT | Performed by: OTOLARYNGOLOGY

## 2025-07-19 PROCEDURE — 93005 ELECTROCARDIOGRAM TRACING: CPT | Performed by: INTERNAL MEDICINE

## 2025-07-19 PROCEDURE — 10002800 HB RX 250 W HCPCS

## 2025-07-19 PROCEDURE — 10002800 HB RX 250 W HCPCS: Performed by: INTERNAL MEDICINE

## 2025-07-19 PROCEDURE — 96372 THER/PROPH/DIAG INJ SC/IM: CPT | Performed by: SPECIALIST

## 2025-07-19 PROCEDURE — 96372 THER/PROPH/DIAG INJ SC/IM: CPT

## 2025-07-19 PROCEDURE — 80048 BASIC METABOLIC PNL TOTAL CA: CPT | Performed by: SPECIALIST

## 2025-07-19 PROCEDURE — 96372 THER/PROPH/DIAG INJ SC/IM: CPT | Performed by: INTERNAL MEDICINE

## 2025-07-19 PROCEDURE — 85025 COMPLETE CBC W/AUTO DIFF WBC: CPT | Performed by: OTOLARYNGOLOGY

## 2025-07-19 PROCEDURE — 10002800 HB RX 250 W HCPCS: Performed by: SPECIALIST

## 2025-07-19 PROCEDURE — A9150 MISC/EXPER NON-PRESCRIPT DRU: HCPCS

## 2025-07-19 PROCEDURE — 93010 ELECTROCARDIOGRAM REPORT: CPT | Performed by: INTERNAL MEDICINE

## 2025-07-19 PROCEDURE — 10004651 HB RX, NO CHARGE ITEM

## 2025-07-19 RX ORDER — INSULIN GLARGINE 100 [IU]/ML
15 INJECTION, SOLUTION SUBCUTANEOUS EVERY 12 HOURS SCHEDULED
Status: DISCONTINUED | OUTPATIENT
Start: 2025-07-19 | End: 2025-07-20

## 2025-07-19 RX ORDER — INSULIN GLARGINE 100 [IU]/ML
10 INJECTION, SOLUTION SUBCUTANEOUS ONCE
Status: COMPLETED | OUTPATIENT
Start: 2025-07-19 | End: 2025-07-19

## 2025-07-19 RX ADMIN — INSULIN LISPRO 5 UNITS: 100 INJECTION, SOLUTION INTRAVENOUS; SUBCUTANEOUS at 12:18

## 2025-07-19 RX ADMIN — INSULIN GLARGINE 10 UNITS: 100 INJECTION, SOLUTION SUBCUTANEOUS at 10:01

## 2025-07-19 RX ADMIN — INSULIN LISPRO 5 UNITS: 100 INJECTION, SOLUTION INTRAVENOUS; SUBCUTANEOUS at 16:50

## 2025-07-19 RX ADMIN — HYDROMORPHONE HYDROCHLORIDE 0.5 MG: 1 INJECTION, SOLUTION INTRAMUSCULAR; INTRAVENOUS; SUBCUTANEOUS at 12:28

## 2025-07-19 RX ADMIN — HYDROMORPHONE HYDROCHLORIDE 0.5 MG: 1 INJECTION, SOLUTION INTRAMUSCULAR; INTRAVENOUS; SUBCUTANEOUS at 10:01

## 2025-07-19 RX ADMIN — CHLORHEXIDINE GLUCONATE 15 ML: 1.2 RINSE ORAL at 08:08

## 2025-07-19 RX ADMIN — METOPROLOL SUCCINATE 50 MG: 50 TABLET, EXTENDED RELEASE ORAL at 08:07

## 2025-07-19 RX ADMIN — LITHIUM CARBONATE 300 MG: 300 CAPSULE, GELATIN COATED ORAL at 08:08

## 2025-07-19 RX ADMIN — INSULIN LISPRO 3 UNITS: 100 INJECTION, SOLUTION INTRAVENOUS; SUBCUTANEOUS at 16:52

## 2025-07-19 RX ADMIN — HEPARIN SODIUM 5000 UNITS: 5000 INJECTION INTRAVENOUS; SUBCUTANEOUS at 14:12

## 2025-07-19 RX ADMIN — HYDROMORPHONE HYDROCHLORIDE 0.5 MG: 1 INJECTION, SOLUTION INTRAMUSCULAR; INTRAVENOUS; SUBCUTANEOUS at 16:49

## 2025-07-19 RX ADMIN — HEPARIN SODIUM 5000 UNITS: 5000 INJECTION INTRAVENOUS; SUBCUTANEOUS at 05:06

## 2025-07-19 RX ADMIN — HYDROMORPHONE HYDROCHLORIDE 0.5 MG: 1 INJECTION, SOLUTION INTRAMUSCULAR; INTRAVENOUS; SUBCUTANEOUS at 06:23

## 2025-07-19 RX ADMIN — QUETIAPINE FUMARATE 100 MG: 100 TABLET, FILM COATED ORAL at 20:27

## 2025-07-19 RX ADMIN — HYDROMORPHONE HYDROCHLORIDE 0.5 MG: 1 INJECTION, SOLUTION INTRAMUSCULAR; INTRAVENOUS; SUBCUTANEOUS at 03:22

## 2025-07-19 RX ADMIN — ACETAMINOPHEN 1000 MG: 500 TABLET ORAL at 12:19

## 2025-07-19 RX ADMIN — CHLORHEXIDINE GLUCONATE 15 ML: 1.2 RINSE ORAL at 12:20

## 2025-07-19 RX ADMIN — AMOXICILLIN AND CLAVULANATE POTASSIUM 1 TABLET: 875; 125 TABLET, FILM COATED ORAL at 20:28

## 2025-07-19 RX ADMIN — OXYCODONE HYDROCHLORIDE 10 MG: 5 TABLET ORAL at 08:06

## 2025-07-19 RX ADMIN — ACETAMINOPHEN 1000 MG: 500 TABLET ORAL at 20:28

## 2025-07-19 RX ADMIN — QUETIAPINE FUMARATE 100 MG: 100 TABLET, FILM COATED ORAL at 08:09

## 2025-07-19 RX ADMIN — SENNOSIDES AND DOCUSATE SODIUM 2 TABLET: 8.6; 5 TABLET ORAL at 20:28

## 2025-07-19 RX ADMIN — ACETAMINOPHEN 1000 MG: 500 TABLET ORAL at 08:07

## 2025-07-19 RX ADMIN — ACETAMINOPHEN 1000 MG: 500 TABLET ORAL at 17:44

## 2025-07-19 RX ADMIN — OXYCODONE HYDROCHLORIDE 10 MG: 5 TABLET ORAL at 17:43

## 2025-07-19 RX ADMIN — INSULIN LISPRO 3 UNITS: 100 INJECTION, SOLUTION INTRAVENOUS; SUBCUTANEOUS at 06:23

## 2025-07-19 RX ADMIN — HYDROMORPHONE HYDROCHLORIDE 0.5 MG: 1 INJECTION, SOLUTION INTRAMUSCULAR; INTRAVENOUS; SUBCUTANEOUS at 20:36

## 2025-07-19 RX ADMIN — CHLORHEXIDINE GLUCONATE 15 ML: 1.2 RINSE ORAL at 20:28

## 2025-07-19 RX ADMIN — INSULIN LISPRO 5 UNITS: 100 INJECTION, SOLUTION INTRAVENOUS; SUBCUTANEOUS at 08:06

## 2025-07-19 RX ADMIN — Medication 100 MG: at 08:07

## 2025-07-19 RX ADMIN — FOLIC ACID 1 MG: 1 TABLET ORAL at 08:07

## 2025-07-19 RX ADMIN — SODIUM CHLORIDE, PRESERVATIVE FREE 2 ML: 5 INJECTION INTRAVENOUS at 20:37

## 2025-07-19 RX ADMIN — INSULIN LISPRO 12 UNITS: 100 INJECTION, SOLUTION INTRAVENOUS; SUBCUTANEOUS at 12:30

## 2025-07-19 RX ADMIN — INSULIN GLARGINE 15 UNITS: 100 INJECTION, SOLUTION SUBCUTANEOUS at 21:40

## 2025-07-19 RX ADMIN — LITHIUM CARBONATE 300 MG: 300 CAPSULE, GELATIN COATED ORAL at 17:44

## 2025-07-19 RX ADMIN — SODIUM CHLORIDE, PRESERVATIVE FREE 2 ML: 5 INJECTION INTRAVENOUS at 08:10

## 2025-07-19 RX ADMIN — AMOXICILLIN AND CLAVULANATE POTASSIUM 1 TABLET: 875; 125 TABLET, FILM COATED ORAL at 08:07

## 2025-07-19 RX ADMIN — HEPARIN SODIUM 5000 UNITS: 5000 INJECTION INTRAVENOUS; SUBCUTANEOUS at 21:10

## 2025-07-19 RX ADMIN — CHLORHEXIDINE GLUCONATE 15 ML: 1.2 RINSE ORAL at 16:49

## 2025-07-19 ASSESSMENT — PAIN SCALES - GENERAL
PAINLEVEL_OUTOF10: 9
PAINLEVEL_OUTOF10: 8
PAINLEVEL_OUTOF10: 4
PAINLEVEL_OUTOF10: 5
PAINLEVEL_OUTOF10: 8
PAINLEVEL_OUTOF10: 6
PAINLEVEL_OUTOF10: 6
PAINLEVEL_OUTOF10: 9
PAINLEVEL_OUTOF10: 6
PAINLEVEL_OUTOF10: 7
PAINLEVEL_OUTOF10: 4
PAINLEVEL_OUTOF10: 7
PAINLEVEL_OUTOF10: 7

## 2025-07-19 ASSESSMENT — PAIN SCALES - WONG BAKER: WONGBAKER_NUMERICALRESPONSE: 10

## 2025-07-20 ENCOUNTER — APPOINTMENT (OUTPATIENT)
Dept: CT IMAGING | Age: 30
DRG: 092 | End: 2025-07-20
Attending: INTERNAL MEDICINE

## 2025-07-20 ENCOUNTER — APPOINTMENT (OUTPATIENT)
Dept: ULTRASOUND IMAGING | Age: 30
DRG: 092 | End: 2025-07-20
Attending: INTERNAL MEDICINE

## 2025-07-20 VITALS
OXYGEN SATURATION: 96 % | BODY MASS INDEX: 25.36 KG/M2 | DIASTOLIC BLOOD PRESSURE: 82 MMHG | HEIGHT: 73 IN | HEART RATE: 97 BPM | TEMPERATURE: 98.4 F | SYSTOLIC BLOOD PRESSURE: 143 MMHG | RESPIRATION RATE: 18 BRPM | WEIGHT: 191.36 LBS

## 2025-07-20 LAB
APTT PPP: 27 SEC (ref 22–32)
APTT PPP: 27 SEC (ref 22–32)
APTT PPP: 42 SEC (ref 22–32)
APTT PPP: 42 SEC (ref 22–32)
ATRIAL RATE (BPM): 105
ATRIAL RATE (BPM): 105
BACTERIA BLD CULT: NORMAL
BACTERIA BLD CULT: NORMAL
BASOPHILS # BLD: 0.1 K/MCL (ref 0–0.3)
BASOPHILS # BLD: 0.1 K/MCL (ref 0–0.3)
BASOPHILS NFR BLD: 0 %
BASOPHILS NFR BLD: 0 %
DEPRECATED RDW RBC: 40.4 FL (ref 39–50)
DEPRECATED RDW RBC: 40.4 FL (ref 39–50)
DEPRECATED RDW RBC: 41 FL (ref 39–50)
DEPRECATED RDW RBC: 41 FL (ref 39–50)
EOSINOPHIL # BLD: 0 K/MCL (ref 0–0.5)
EOSINOPHIL # BLD: 0 K/MCL (ref 0–0.5)
EOSINOPHIL NFR BLD: 0 %
EOSINOPHIL NFR BLD: 0 %
ERYTHROCYTE [DISTWIDTH] IN BLOOD: 12.4 % (ref 11–15)
ERYTHROCYTE [DISTWIDTH] IN BLOOD: 12.4 % (ref 11–15)
ERYTHROCYTE [DISTWIDTH] IN BLOOD: 12.5 % (ref 11–15)
ERYTHROCYTE [DISTWIDTH] IN BLOOD: 12.5 % (ref 11–15)
GLUCOSE BLDC GLUCOMTR-MCNC: 249 MG/DL (ref 70–99)
GLUCOSE BLDC GLUCOMTR-MCNC: 249 MG/DL (ref 70–99)
GLUCOSE BLDC GLUCOMTR-MCNC: 262 MG/DL (ref 70–99)
GLUCOSE BLDC GLUCOMTR-MCNC: 262 MG/DL (ref 70–99)
GLUCOSE BLDC GLUCOMTR-MCNC: 305 MG/DL (ref 70–99)
GLUCOSE BLDC GLUCOMTR-MCNC: 305 MG/DL (ref 70–99)
GLUCOSE BLDC GLUCOMTR-MCNC: 340 MG/DL (ref 70–99)
GLUCOSE BLDC GLUCOMTR-MCNC: 340 MG/DL (ref 70–99)
HCT VFR BLD CALC: 41.7 % (ref 39–51)
HCT VFR BLD CALC: 41.7 % (ref 39–51)
HCT VFR BLD CALC: 46.2 % (ref 39–51)
HCT VFR BLD CALC: 46.2 % (ref 39–51)
HGB BLD-MCNC: 14.6 G/DL (ref 13–17)
HGB BLD-MCNC: 14.6 G/DL (ref 13–17)
HGB BLD-MCNC: 16.2 G/DL (ref 13–17)
HGB BLD-MCNC: 16.2 G/DL (ref 13–17)
IMM GRANULOCYTES # BLD AUTO: 0 K/MCL (ref 0–0.2)
IMM GRANULOCYTES # BLD AUTO: 0 K/MCL (ref 0–0.2)
IMM GRANULOCYTES # BLD: 0 %
IMM GRANULOCYTES # BLD: 0 %
INR PPP: 0.9
INR PPP: 0.9
LYMPHOCYTES # BLD: 2.3 K/MCL (ref 1–4.8)
LYMPHOCYTES # BLD: 2.3 K/MCL (ref 1–4.8)
LYMPHOCYTES NFR BLD: 18 %
LYMPHOCYTES NFR BLD: 18 %
MAGNESIUM SERPL-MCNC: 1.8 MG/DL (ref 1.7–2.4)
MAGNESIUM SERPL-MCNC: 1.8 MG/DL (ref 1.7–2.4)
MAGNESIUM SERPL-MCNC: 1.9 MG/DL (ref 1.7–2.4)
MAGNESIUM SERPL-MCNC: 1.9 MG/DL (ref 1.7–2.4)
MAGNESIUM SERPL-MCNC: 2 MG/DL (ref 1.7–2.4)
MAGNESIUM SERPL-MCNC: 2 MG/DL (ref 1.7–2.4)
MCH RBC QN AUTO: 31.1 PG (ref 26–34)
MCH RBC QN AUTO: 31.1 PG (ref 26–34)
MCH RBC QN AUTO: 31.2 PG (ref 26–34)
MCH RBC QN AUTO: 31.2 PG (ref 26–34)
MCHC RBC AUTO-ENTMCNC: 35 G/DL (ref 32–36.5)
MCHC RBC AUTO-ENTMCNC: 35 G/DL (ref 32–36.5)
MCHC RBC AUTO-ENTMCNC: 35.1 G/DL (ref 32–36.5)
MCHC RBC AUTO-ENTMCNC: 35.1 G/DL (ref 32–36.5)
MCV RBC AUTO: 88.8 FL (ref 78–100)
MCV RBC AUTO: 88.8 FL (ref 78–100)
MCV RBC AUTO: 88.9 FL (ref 78–100)
MCV RBC AUTO: 88.9 FL (ref 78–100)
MONOCYTES # BLD: 1 K/MCL (ref 0.3–0.9)
MONOCYTES # BLD: 1 K/MCL (ref 0.3–0.9)
MONOCYTES NFR BLD: 7 %
MONOCYTES NFR BLD: 7 %
NEUTROPHILS # BLD: 9.6 K/MCL (ref 1.8–7.7)
NEUTROPHILS # BLD: 9.6 K/MCL (ref 1.8–7.7)
NEUTROPHILS NFR BLD: 75 %
NEUTROPHILS NFR BLD: 75 %
NRBC BLD MANUAL-RTO: 0 /100 WBC
P AXIS (DEGREES): 57
P AXIS (DEGREES): 57
PHOSPHATE SERPL-MCNC: 2.7 MG/DL (ref 2.4–4.7)
PHOSPHATE SERPL-MCNC: 2.7 MG/DL (ref 2.4–4.7)
PHOSPHATE SERPL-MCNC: 2.9 MG/DL (ref 2.4–4.7)
PHOSPHATE SERPL-MCNC: 2.9 MG/DL (ref 2.4–4.7)
PHOSPHATE SERPL-MCNC: 3.7 MG/DL (ref 2.4–4.7)
PHOSPHATE SERPL-MCNC: 3.7 MG/DL (ref 2.4–4.7)
PLATELET # BLD AUTO: 334 K/MCL (ref 140–450)
PLATELET # BLD AUTO: 334 K/MCL (ref 140–450)
PLATELET # BLD AUTO: 366 K/MCL (ref 140–450)
PLATELET # BLD AUTO: 366 K/MCL (ref 140–450)
PR-INTERVAL (MSEC): 130
PR-INTERVAL (MSEC): 130
PROTHROMBIN TIME: 10.3 SEC (ref 9.7–11.8)
PROTHROMBIN TIME: 10.3 SEC (ref 9.7–11.8)
QRS-INTERVAL (MSEC): 84
QRS-INTERVAL (MSEC): 84
QT-INTERVAL (MSEC): 342
QT-INTERVAL (MSEC): 342
QTC: 452
QTC: 452
R AXIS (DEGREES): 61
R AXIS (DEGREES): 61
RBC # BLD: 4.69 MIL/MCL (ref 4.5–5.9)
RBC # BLD: 4.69 MIL/MCL (ref 4.5–5.9)
RBC # BLD: 5.2 MIL/MCL (ref 4.5–5.9)
RBC # BLD: 5.2 MIL/MCL (ref 4.5–5.9)
REPORT TEXT: NORMAL
REPORT TEXT: NORMAL
T AXIS (DEGREES): 46
T AXIS (DEGREES): 46
TROPONIN I SERPL DL<=0.01 NG/ML-MCNC: <4 NG/L
TROPONIN I SERPL DL<=0.01 NG/ML-MCNC: <4 NG/L
VENTRICULAR RATE EKG/MIN (BPM): 105
VENTRICULAR RATE EKG/MIN (BPM): 105
WBC # BLD: 10.3 K/MCL (ref 4.2–11)
WBC # BLD: 10.3 K/MCL (ref 4.2–11)
WBC # BLD: 13 K/MCL (ref 4.2–11)
WBC # BLD: 13 K/MCL (ref 4.2–11)

## 2025-07-20 PROCEDURE — 10004281 HB COUNTER-STAFF TIME PER 15 MIN

## 2025-07-20 PROCEDURE — 85025 COMPLETE CBC W/AUTO DIFF WBC: CPT | Performed by: OTOLARYNGOLOGY

## 2025-07-20 PROCEDURE — 10002800 HB RX 250 W HCPCS: Performed by: SPECIALIST

## 2025-07-20 PROCEDURE — 10002800 HB RX 250 W HCPCS

## 2025-07-20 PROCEDURE — A4216 STERILE WATER/SALINE, 10 ML: HCPCS | Performed by: OTOLARYNGOLOGY

## 2025-07-20 PROCEDURE — 84484 ASSAY OF TROPONIN QUANT: CPT | Performed by: INTERNAL MEDICINE

## 2025-07-20 PROCEDURE — 10004651 HB RX, NO CHARGE ITEM: Performed by: OTOLARYNGOLOGY

## 2025-07-20 PROCEDURE — 84100 ASSAY OF PHOSPHORUS: CPT | Performed by: OTOLARYNGOLOGY

## 2025-07-20 PROCEDURE — 10002807 HB RX 258: Performed by: SPECIALIST

## 2025-07-20 PROCEDURE — 85027 COMPLETE CBC AUTOMATED: CPT | Performed by: INTERNAL MEDICINE

## 2025-07-20 PROCEDURE — 85610 PROTHROMBIN TIME: CPT | Performed by: INTERNAL MEDICINE

## 2025-07-20 PROCEDURE — 36415 COLL VENOUS BLD VENIPUNCTURE: CPT | Performed by: OTOLARYNGOLOGY

## 2025-07-20 PROCEDURE — 10002803 HB RX 637: Performed by: OTOLARYNGOLOGY

## 2025-07-20 PROCEDURE — 96372 THER/PROPH/DIAG INJ SC/IM: CPT | Performed by: SPECIALIST

## 2025-07-20 PROCEDURE — 83735 ASSAY OF MAGNESIUM: CPT | Performed by: OTOLARYNGOLOGY

## 2025-07-20 PROCEDURE — 10002803 HB RX 637: Performed by: INTERNAL MEDICINE

## 2025-07-20 PROCEDURE — 10002800 HB RX 250 W HCPCS: Performed by: INTERNAL MEDICINE

## 2025-07-20 PROCEDURE — 96372 THER/PROPH/DIAG INJ SC/IM: CPT

## 2025-07-20 PROCEDURE — 10002805 HB CONTRAST AGENT: Performed by: INTERNAL MEDICINE

## 2025-07-20 PROCEDURE — 13003377

## 2025-07-20 PROCEDURE — 10006031 HB ROOM CHARGE TELEMETRY

## 2025-07-20 PROCEDURE — 10002803 HB RX 637

## 2025-07-20 PROCEDURE — 71275 CT ANGIOGRAPHY CHEST: CPT

## 2025-07-20 PROCEDURE — 85730 THROMBOPLASTIN TIME PARTIAL: CPT | Performed by: INTERNAL MEDICINE

## 2025-07-20 PROCEDURE — A9150 MISC/EXPER NON-PRESCRIPT DRU: HCPCS

## 2025-07-20 PROCEDURE — 93970 EXTREMITY STUDY: CPT

## 2025-07-20 PROCEDURE — 99253 IP/OBS CNSLTJ NEW/EST LOW 45: CPT

## 2025-07-20 PROCEDURE — 10004651 HB RX, NO CHARGE ITEM

## 2025-07-20 RX ORDER — HEPARIN SODIUM 10000 [USP'U]/100ML
1-40 INJECTION, SOLUTION INTRAVENOUS CONTINUOUS
Status: DISCONTINUED | OUTPATIENT
Start: 2025-07-20 | End: 2025-07-20 | Stop reason: CLARIF

## 2025-07-20 RX ORDER — INSULIN GLARGINE 100 [IU]/ML
3 INJECTION, SOLUTION SUBCUTANEOUS ONCE
Status: COMPLETED | OUTPATIENT
Start: 2025-07-20 | End: 2025-07-20

## 2025-07-20 RX ORDER — INSULIN LISPRO 100 [IU]/ML
10 INJECTION, SOLUTION INTRAVENOUS; SUBCUTANEOUS
Status: DISCONTINUED | OUTPATIENT
Start: 2025-07-20 | End: 2025-07-21

## 2025-07-20 RX ORDER — INSULIN GLARGINE 100 [IU]/ML
18 INJECTION, SOLUTION SUBCUTANEOUS EVERY 12 HOURS SCHEDULED
Status: DISCONTINUED | OUTPATIENT
Start: 2025-07-20 | End: 2025-07-21

## 2025-07-20 RX ORDER — SODIUM CHLORIDE 9 MG/ML
INJECTION, SOLUTION INTRAVENOUS CONTINUOUS
Status: DISCONTINUED | OUTPATIENT
Start: 2025-07-20 | End: 2025-07-22 | Stop reason: HOSPADM

## 2025-07-20 RX ORDER — HEPARIN SODIUM 10000 [USP'U]/100ML
1-40 INJECTION, SOLUTION INTRAVENOUS CONTINUOUS
Status: DISCONTINUED | OUTPATIENT
Start: 2025-07-20 | End: 2025-07-21

## 2025-07-20 RX ADMIN — HYDROMORPHONE HYDROCHLORIDE 0.5 MG: 1 INJECTION, SOLUTION INTRAMUSCULAR; INTRAVENOUS; SUBCUTANEOUS at 11:46

## 2025-07-20 RX ADMIN — METOPROLOL SUCCINATE 50 MG: 50 TABLET, EXTENDED RELEASE ORAL at 08:21

## 2025-07-20 RX ADMIN — OXYCODONE HYDROCHLORIDE 10 MG: 5 TABLET ORAL at 08:20

## 2025-07-20 RX ADMIN — CHLORHEXIDINE GLUCONATE 15 ML: 1.2 RINSE ORAL at 08:20

## 2025-07-20 RX ADMIN — HYDROMORPHONE HYDROCHLORIDE 0.5 MG: 1 INJECTION, SOLUTION INTRAMUSCULAR; INTRAVENOUS; SUBCUTANEOUS at 07:28

## 2025-07-20 RX ADMIN — LITHIUM CARBONATE 300 MG: 300 CAPSULE, GELATIN COATED ORAL at 08:30

## 2025-07-20 RX ADMIN — INSULIN GLARGINE 3 UNITS: 100 INJECTION, SOLUTION SUBCUTANEOUS at 16:44

## 2025-07-20 RX ADMIN — SODIUM CHLORIDE, PRESERVATIVE FREE 2 ML: 5 INJECTION INTRAVENOUS at 08:22

## 2025-07-20 RX ADMIN — HYDROMORPHONE HYDROCHLORIDE 0.5 MG: 1 INJECTION, SOLUTION INTRAMUSCULAR; INTRAVENOUS; SUBCUTANEOUS at 05:15

## 2025-07-20 RX ADMIN — CHLORHEXIDINE GLUCONATE 15 ML: 1.2 RINSE ORAL at 13:47

## 2025-07-20 RX ADMIN — INSULIN GLARGINE 18 UNITS: 100 INJECTION, SOLUTION SUBCUTANEOUS at 21:43

## 2025-07-20 RX ADMIN — ACETAMINOPHEN 1000 MG: 500 TABLET ORAL at 18:13

## 2025-07-20 RX ADMIN — AMPICILLIN SODIUM AND SULBACTAM SODIUM 3 G: 2; 1 INJECTION, POWDER, FOR SOLUTION INTRAMUSCULAR; INTRAVENOUS at 18:16

## 2025-07-20 RX ADMIN — INSULIN LISPRO 5 UNITS: 100 INJECTION, SOLUTION INTRAVENOUS; SUBCUTANEOUS at 08:26

## 2025-07-20 RX ADMIN — HEPARIN SODIUM 5000 UNITS: 5000 INJECTION INTRAVENOUS; SUBCUTANEOUS at 13:47

## 2025-07-20 RX ADMIN — INSULIN LISPRO 12 UNITS: 100 INJECTION, SOLUTION INTRAVENOUS; SUBCUTANEOUS at 08:26

## 2025-07-20 RX ADMIN — INSULIN LISPRO 8 UNITS: 100 INJECTION, SOLUTION INTRAVENOUS; SUBCUTANEOUS at 11:36

## 2025-07-20 RX ADMIN — OXYCODONE HYDROCHLORIDE 10 MG: 5 TABLET ORAL at 19:06

## 2025-07-20 RX ADMIN — CHLORHEXIDINE GLUCONATE 15 ML: 1.2 RINSE ORAL at 21:43

## 2025-07-20 RX ADMIN — QUETIAPINE FUMARATE 100 MG: 100 TABLET, FILM COATED ORAL at 08:21

## 2025-07-20 RX ADMIN — QUETIAPINE FUMARATE 100 MG: 100 TABLET, FILM COATED ORAL at 21:42

## 2025-07-20 RX ADMIN — AMPICILLIN SODIUM AND SULBACTAM SODIUM 3 G: 2; 1 INJECTION, POWDER, FOR SOLUTION INTRAMUSCULAR; INTRAVENOUS at 11:45

## 2025-07-20 RX ADMIN — LITHIUM CARBONATE 300 MG: 300 CAPSULE, GELATIN COATED ORAL at 18:19

## 2025-07-20 RX ADMIN — HYDROMORPHONE HYDROCHLORIDE 0.5 MG: 1 INJECTION, SOLUTION INTRAMUSCULAR; INTRAVENOUS; SUBCUTANEOUS at 00:56

## 2025-07-20 RX ADMIN — INSULIN LISPRO 2 UNITS: 100 INJECTION, SOLUTION INTRAVENOUS; SUBCUTANEOUS at 21:45

## 2025-07-20 RX ADMIN — OXYCODONE HYDROCHLORIDE 10 MG: 5 TABLET ORAL at 00:56

## 2025-07-20 RX ADMIN — IOHEXOL 75 ML: 350 INJECTION, SOLUTION INTRAVENOUS at 15:37

## 2025-07-20 RX ADMIN — INSULIN LISPRO 4 UNITS: 100 INJECTION, SOLUTION INTRAVENOUS; SUBCUTANEOUS at 18:09

## 2025-07-20 RX ADMIN — HEPARIN SODIUM 5000 UNITS: 5000 INJECTION INTRAVENOUS; SUBCUTANEOUS at 05:16

## 2025-07-20 RX ADMIN — ACETAMINOPHEN 1000 MG: 500 TABLET ORAL at 21:42

## 2025-07-20 RX ADMIN — ACETAMINOPHEN 1000 MG: 500 TABLET ORAL at 13:47

## 2025-07-20 RX ADMIN — ACETAMINOPHEN 1000 MG: 500 TABLET ORAL at 08:22

## 2025-07-20 RX ADMIN — AMPICILLIN SODIUM AND SULBACTAM SODIUM 3 G: 2; 1 INJECTION, POWDER, FOR SOLUTION INTRAMUSCULAR; INTRAVENOUS at 23:28

## 2025-07-20 RX ADMIN — INSULIN GLARGINE 15 UNITS: 100 INJECTION, SOLUTION SUBCUTANEOUS at 10:28

## 2025-07-20 RX ADMIN — SODIUM CHLORIDE, PRESERVATIVE FREE 2 ML: 5 INJECTION INTRAVENOUS at 21:45

## 2025-07-20 RX ADMIN — Medication 100 MG: at 08:22

## 2025-07-20 RX ADMIN — INSULIN LISPRO 10 UNITS: 100 INJECTION, SOLUTION INTRAVENOUS; SUBCUTANEOUS at 18:08

## 2025-07-20 RX ADMIN — SODIUM CHLORIDE: 9 INJECTION, SOLUTION INTRAVENOUS at 11:35

## 2025-07-20 RX ADMIN — HYDROMORPHONE HYDROCHLORIDE 0.5 MG: 1 INJECTION, SOLUTION INTRAMUSCULAR; INTRAVENOUS; SUBCUTANEOUS at 13:48

## 2025-07-20 RX ADMIN — INSULIN LISPRO 10 UNITS: 100 INJECTION, SOLUTION INTRAVENOUS; SUBCUTANEOUS at 11:36

## 2025-07-20 RX ADMIN — HYDROMORPHONE HYDROCHLORIDE 0.5 MG: 1 INJECTION, SOLUTION INTRAMUSCULAR; INTRAVENOUS; SUBCUTANEOUS at 18:06

## 2025-07-20 RX ADMIN — AMOXICILLIN AND CLAVULANATE POTASSIUM 1 TABLET: 875; 125 TABLET, FILM COATED ORAL at 08:22

## 2025-07-20 RX ADMIN — FOLIC ACID 1 MG: 1 TABLET ORAL at 08:20

## 2025-07-20 RX ADMIN — HEPARIN SODIUM 18 UNITS/KG/HR: 10000 INJECTION, SOLUTION INTRAVENOUS at 18:04

## 2025-07-20 RX ADMIN — HYDROMORPHONE HYDROCHLORIDE 0.5 MG: 1 INJECTION, SOLUTION INTRAMUSCULAR; INTRAVENOUS; SUBCUTANEOUS at 23:36

## 2025-07-20 RX ADMIN — CHLORHEXIDINE GLUCONATE 15 ML: 1.2 RINSE ORAL at 18:13

## 2025-07-20 ASSESSMENT — PAIN SCALES - GENERAL
PAINLEVEL_OUTOF10: 7
PAINLEVEL_OUTOF10: 0
PAINLEVEL_OUTOF10: 0
PAINLEVEL_OUTOF10: 7
PAINLEVEL_OUTOF10: 0
PAINLEVEL_OUTOF10: 7
PAINLEVEL_OUTOF10: 8
PAINLEVEL_OUTOF10: 7
PAINLEVEL_OUTOF10: 7
PAINLEVEL_OUTOF10: 10
PAINLEVEL_OUTOF10: 7
PAINLEVEL_OUTOF10: 6
PAINLEVEL_OUTOF10: 7
PAINLEVEL_OUTOF10: 7
PAINLEVEL_OUTOF10: 8
PAINLEVEL_OUTOF10: 7
PAINLEVEL_OUTOF10: 10

## 2025-07-20 ASSESSMENT — ENCOUNTER SYMPTOMS: FACIAL SWELLING: 1

## 2025-07-20 ASSESSMENT — PAIN SCALES - WONG BAKER: WONGBAKER_NUMERICALRESPONSE: 7

## 2025-07-21 LAB
ANION GAP SERPL CALC-SCNC: 7 MMOL/L (ref 7–19)
APTT PPP: 54 SEC (ref 22–32)
APTT PPP: 56 SEC (ref 22–32)
ATRIAL RATE (BPM): 94
BASOPHILS # BLD: 0 K/MCL (ref 0–0.3)
BASOPHILS # BLD: 0.1 K/MCL (ref 0–0.3)
BASOPHILS NFR BLD: 0 %
BASOPHILS NFR BLD: 1 %
BUN SERPL-MCNC: 8 MG/DL (ref 6–20)
BUN/CREAT SERPL: 10 (ref 7–25)
CALCIUM SERPL-MCNC: 8.8 MG/DL (ref 8.4–10.2)
CHLORIDE SERPL-SCNC: 101 MMOL/L (ref 97–110)
CO2 SERPL-SCNC: 30 MMOL/L (ref 21–32)
CREAT SERPL-MCNC: 0.79 MG/DL (ref 0.67–1.17)
DEPRECATED RDW RBC: 39.8 FL (ref 39–50)
DEPRECATED RDW RBC: 39.8 FL (ref 39–50)
EGFRCR SERPLBLD CKD-EPI 2021: >90 ML/MIN/{1.73_M2}
EOSINOPHIL # BLD: 0.1 K/MCL (ref 0–0.5)
EOSINOPHIL # BLD: 0.1 K/MCL (ref 0–0.5)
EOSINOPHIL NFR BLD: 1 %
EOSINOPHIL NFR BLD: 1 %
ERYTHROCYTE [DISTWIDTH] IN BLOOD: 12.5 % (ref 11–15)
ERYTHROCYTE [DISTWIDTH] IN BLOOD: 12.6 % (ref 11–15)
FASTING DURATION TIME PATIENT: ABNORMAL H
GLUCOSE BLDC GLUCOMTR-MCNC: 246 MG/DL (ref 70–99)
GLUCOSE BLDC GLUCOMTR-MCNC: 276 MG/DL (ref 70–99)
GLUCOSE BLDC GLUCOMTR-MCNC: 317 MG/DL (ref 70–99)
GLUCOSE BLDC GLUCOMTR-MCNC: 85 MG/DL (ref 70–99)
GLUCOSE SERPL-MCNC: 280 MG/DL (ref 70–99)
HCT VFR BLD CALC: 40.4 % (ref 39–51)
HCT VFR BLD CALC: 41.2 % (ref 39–51)
HGB BLD-MCNC: 14.5 G/DL (ref 13–17)
HGB BLD-MCNC: 14.6 G/DL (ref 13–17)
IMM GRANULOCYTES # BLD AUTO: 0 K/MCL (ref 0–0.2)
IMM GRANULOCYTES # BLD AUTO: 0.1 K/MCL (ref 0–0.2)
IMM GRANULOCYTES # BLD: 0 %
IMM GRANULOCYTES # BLD: 1 %
LYMPHOCYTES # BLD: 2.2 K/MCL (ref 1–4.8)
LYMPHOCYTES # BLD: 2.3 K/MCL (ref 1–4.8)
LYMPHOCYTES NFR BLD: 23 %
LYMPHOCYTES NFR BLD: 25 %
MAGNESIUM SERPL-MCNC: 1.8 MG/DL (ref 1.7–2.4)
MAGNESIUM SERPL-MCNC: 1.9 MG/DL (ref 1.7–2.4)
MAGNESIUM SERPL-MCNC: 2 MG/DL (ref 1.7–2.4)
MCH RBC QN AUTO: 30.9 PG (ref 26–34)
MCH RBC QN AUTO: 31.3 PG (ref 26–34)
MCHC RBC AUTO-ENTMCNC: 35.4 G/DL (ref 32–36.5)
MCHC RBC AUTO-ENTMCNC: 35.9 G/DL (ref 32–36.5)
MCV RBC AUTO: 87.1 FL (ref 78–100)
MCV RBC AUTO: 87.3 FL (ref 78–100)
MONOCYTES # BLD: 0.7 K/MCL (ref 0.3–0.9)
MONOCYTES # BLD: 0.8 K/MCL (ref 0.3–0.9)
MONOCYTES NFR BLD: 8 %
MONOCYTES NFR BLD: 9 %
NEUTROPHILS # BLD: 6 K/MCL (ref 1.8–7.7)
NEUTROPHILS # BLD: 6.4 K/MCL (ref 1.8–7.7)
NEUTROPHILS NFR BLD: 63 %
NEUTROPHILS NFR BLD: 68 %
NRBC BLD MANUAL-RTO: 0 /100 WBC
NRBC BLD MANUAL-RTO: 0 /100 WBC
P AXIS (DEGREES): 48
PHOSPHATE SERPL-MCNC: 2.9 MG/DL (ref 2.4–4.7)
PHOSPHATE SERPL-MCNC: 3.1 MG/DL (ref 2.4–4.7)
PHOSPHATE SERPL-MCNC: 3.5 MG/DL (ref 2.4–4.7)
PLATELET # BLD AUTO: 333 K/MCL (ref 140–450)
PLATELET # BLD AUTO: 349 K/MCL (ref 140–450)
POTASSIUM SERPL-SCNC: 3.7 MMOL/L (ref 3.4–5.1)
PR-INTERVAL (MSEC): 128
QRS-INTERVAL (MSEC): 96
QT-INTERVAL (MSEC): 350
QTC: 437
R AXIS (DEGREES): 51
RAINBOW EXTRA TUBES HOLD SPECIMEN: NORMAL
RBC # BLD: 4.63 MIL/MCL (ref 4.5–5.9)
RBC # BLD: 4.73 MIL/MCL (ref 4.5–5.9)
REPORT TEXT: NORMAL
SODIUM SERPL-SCNC: 134 MMOL/L (ref 135–145)
T AXIS (DEGREES): 36
VENTRICULAR RATE EKG/MIN (BPM): 94
WBC # BLD: 9.3 K/MCL (ref 4.2–11)
WBC # BLD: 9.5 K/MCL (ref 4.2–11)

## 2025-07-21 PROCEDURE — 85025 COMPLETE CBC W/AUTO DIFF WBC: CPT | Performed by: SPECIALIST

## 2025-07-21 PROCEDURE — A9150 MISC/EXPER NON-PRESCRIPT DRU: HCPCS

## 2025-07-21 PROCEDURE — 85025 COMPLETE CBC W/AUTO DIFF WBC: CPT | Performed by: OTOLARYNGOLOGY

## 2025-07-21 PROCEDURE — 80048 BASIC METABOLIC PNL TOTAL CA: CPT | Performed by: SPECIALIST

## 2025-07-21 PROCEDURE — 10002803 HB RX 637: Performed by: INTERNAL MEDICINE

## 2025-07-21 PROCEDURE — 84100 ASSAY OF PHOSPHORUS: CPT | Performed by: OTOLARYNGOLOGY

## 2025-07-21 PROCEDURE — A4216 STERILE WATER/SALINE, 10 ML: HCPCS | Performed by: OTOLARYNGOLOGY

## 2025-07-21 PROCEDURE — 93010 ELECTROCARDIOGRAM REPORT: CPT | Performed by: INTERNAL MEDICINE

## 2025-07-21 PROCEDURE — 86341 ISLET CELL ANTIBODY: CPT

## 2025-07-21 PROCEDURE — 96372 THER/PROPH/DIAG INJ SC/IM: CPT

## 2025-07-21 PROCEDURE — 93005 ELECTROCARDIOGRAM TRACING: CPT | Performed by: INTERNAL MEDICINE

## 2025-07-21 PROCEDURE — 96372 THER/PROPH/DIAG INJ SC/IM: CPT | Performed by: HOSPITALIST

## 2025-07-21 PROCEDURE — 10000002 HB ROOM CHARGE MED SURG

## 2025-07-21 PROCEDURE — 83735 ASSAY OF MAGNESIUM: CPT | Performed by: OTOLARYNGOLOGY

## 2025-07-21 PROCEDURE — 85730 THROMBOPLASTIN TIME PARTIAL: CPT | Performed by: INTERNAL MEDICINE

## 2025-07-21 PROCEDURE — 10002803 HB RX 637

## 2025-07-21 PROCEDURE — 10002807 HB RX 258: Performed by: SPECIALIST

## 2025-07-21 PROCEDURE — 36415 COLL VENOUS BLD VENIPUNCTURE: CPT | Performed by: SPECIALIST

## 2025-07-21 PROCEDURE — 10002800 HB RX 250 W HCPCS

## 2025-07-21 PROCEDURE — 10004651 HB RX, NO CHARGE ITEM: Performed by: OTOLARYNGOLOGY

## 2025-07-21 PROCEDURE — 10002803 HB RX 637: Performed by: OTOLARYNGOLOGY

## 2025-07-21 PROCEDURE — 10002800 HB RX 250 W HCPCS: Performed by: SPECIALIST

## 2025-07-21 PROCEDURE — 83735 ASSAY OF MAGNESIUM: CPT | Performed by: INTERNAL MEDICINE

## 2025-07-21 PROCEDURE — 10004651 HB RX, NO CHARGE ITEM

## 2025-07-21 PROCEDURE — 99232 SBSQ HOSP IP/OBS MODERATE 35: CPT | Performed by: HOSPITALIST

## 2025-07-21 PROCEDURE — 10002800 HB RX 250 W HCPCS: Performed by: HOSPITALIST

## 2025-07-21 PROCEDURE — 86337 INSULIN ANTIBODIES: CPT

## 2025-07-21 PROCEDURE — 84100 ASSAY OF PHOSPHORUS: CPT | Performed by: INTERNAL MEDICINE

## 2025-07-21 RX ORDER — INSULIN GLARGINE 100 [IU]/ML
22 INJECTION, SOLUTION SUBCUTANEOUS EVERY 12 HOURS SCHEDULED
Status: DISCONTINUED | OUTPATIENT
Start: 2025-07-21 | End: 2025-07-22 | Stop reason: HOSPADM

## 2025-07-21 RX ORDER — INSULIN LISPRO 100 [IU]/ML
12 INJECTION, SOLUTION INTRAVENOUS; SUBCUTANEOUS
Status: DISCONTINUED | OUTPATIENT
Start: 2025-07-21 | End: 2025-07-22 | Stop reason: HOSPADM

## 2025-07-21 RX ORDER — METOPROLOL SUCCINATE 50 MG/1
100 TABLET, EXTENDED RELEASE ORAL DAILY
Status: DISCONTINUED | OUTPATIENT
Start: 2025-07-21 | End: 2025-07-22 | Stop reason: HOSPADM

## 2025-07-21 RX ADMIN — ACETAMINOPHEN 1000 MG: 500 TABLET ORAL at 09:22

## 2025-07-21 RX ADMIN — AMPICILLIN SODIUM AND SULBACTAM SODIUM 3 G: 2; 1 INJECTION, POWDER, FOR SOLUTION INTRAMUSCULAR; INTRAVENOUS at 06:05

## 2025-07-21 RX ADMIN — INSULIN LISPRO 10 UNITS: 100 INJECTION, SOLUTION INTRAVENOUS; SUBCUTANEOUS at 08:08

## 2025-07-21 RX ADMIN — FOLIC ACID 1 MG: 1 TABLET ORAL at 08:06

## 2025-07-21 RX ADMIN — LITHIUM CARBONATE 300 MG: 300 CAPSULE, GELATIN COATED ORAL at 08:05

## 2025-07-21 RX ADMIN — Medication 100 MG: at 08:04

## 2025-07-21 RX ADMIN — HYDROMORPHONE HYDROCHLORIDE 0.5 MG: 1 INJECTION, SOLUTION INTRAMUSCULAR; INTRAVENOUS; SUBCUTANEOUS at 08:09

## 2025-07-21 RX ADMIN — APIXABAN 10 MG: 5 TABLET, FILM COATED ORAL at 11:05

## 2025-07-21 RX ADMIN — CHLORHEXIDINE GLUCONATE 15 ML: 1.2 RINSE ORAL at 17:49

## 2025-07-21 RX ADMIN — INSULIN GLARGINE 22 UNITS: 100 INJECTION, SOLUTION SUBCUTANEOUS at 22:20

## 2025-07-21 RX ADMIN — APIXABAN 10 MG: 5 TABLET, FILM COATED ORAL at 21:00

## 2025-07-21 RX ADMIN — INSULIN GLARGINE 18 UNITS: 100 INJECTION, SOLUTION SUBCUTANEOUS at 09:23

## 2025-07-21 RX ADMIN — CHLORHEXIDINE GLUCONATE 15 ML: 1.2 RINSE ORAL at 21:00

## 2025-07-21 RX ADMIN — HYDROMORPHONE HYDROCHLORIDE 0.5 MG: 1 INJECTION, SOLUTION INTRAMUSCULAR; INTRAVENOUS; SUBCUTANEOUS at 18:18

## 2025-07-21 RX ADMIN — SODIUM CHLORIDE, PRESERVATIVE FREE 2 ML: 5 INJECTION INTRAVENOUS at 21:04

## 2025-07-21 RX ADMIN — OXYCODONE HYDROCHLORIDE 10 MG: 5 TABLET ORAL at 06:10

## 2025-07-21 RX ADMIN — ACETAMINOPHEN 1000 MG: 500 TABLET ORAL at 18:18

## 2025-07-21 RX ADMIN — QUETIAPINE FUMARATE 100 MG: 100 TABLET, FILM COATED ORAL at 21:00

## 2025-07-21 RX ADMIN — QUETIAPINE FUMARATE 100 MG: 100 TABLET, FILM COATED ORAL at 08:04

## 2025-07-21 RX ADMIN — ACETAMINOPHEN 1000 MG: 500 TABLET ORAL at 14:16

## 2025-07-21 RX ADMIN — HYDROMORPHONE HYDROCHLORIDE 0.5 MG: 1 INJECTION, SOLUTION INTRAMUSCULAR; INTRAVENOUS; SUBCUTANEOUS at 12:18

## 2025-07-21 RX ADMIN — SODIUM CHLORIDE, PRESERVATIVE FREE 2 ML: 5 INJECTION INTRAVENOUS at 08:10

## 2025-07-21 RX ADMIN — CHLORHEXIDINE GLUCONATE 15 ML: 1.2 RINSE ORAL at 13:39

## 2025-07-21 RX ADMIN — INSULIN LISPRO 6 UNITS: 100 INJECTION, SOLUTION INTRAVENOUS; SUBCUTANEOUS at 08:09

## 2025-07-21 RX ADMIN — CHLORHEXIDINE GLUCONATE 15 ML: 1.2 RINSE ORAL at 08:09

## 2025-07-21 RX ADMIN — HYDROMORPHONE HYDROCHLORIDE 0.5 MG: 1 INJECTION, SOLUTION INTRAMUSCULAR; INTRAVENOUS; SUBCUTANEOUS at 16:09

## 2025-07-21 RX ADMIN — METOPROLOL SUCCINATE 100 MG: 50 TABLET, EXTENDED RELEASE ORAL at 08:06

## 2025-07-21 RX ADMIN — HYDROMORPHONE HYDROCHLORIDE 0.5 MG: 1 INJECTION, SOLUTION INTRAMUSCULAR; INTRAVENOUS; SUBCUTANEOUS at 14:20

## 2025-07-21 RX ADMIN — INSULIN LISPRO 12 UNITS: 100 INJECTION, SOLUTION INTRAVENOUS; SUBCUTANEOUS at 12:14

## 2025-07-21 RX ADMIN — OXYCODONE HYDROCHLORIDE 10 MG: 5 TABLET ORAL at 21:30

## 2025-07-21 RX ADMIN — INSULIN LISPRO 8 UNITS: 100 INJECTION, SOLUTION INTRAVENOUS; SUBCUTANEOUS at 11:41

## 2025-07-21 RX ADMIN — HYDROMORPHONE HYDROCHLORIDE 0.5 MG: 1 INJECTION, SOLUTION INTRAMUSCULAR; INTRAVENOUS; SUBCUTANEOUS at 20:58

## 2025-07-21 RX ADMIN — LITHIUM CARBONATE 300 MG: 300 CAPSULE, GELATIN COATED ORAL at 17:50

## 2025-07-21 RX ADMIN — HYDROMORPHONE HYDROCHLORIDE 0.5 MG: 1 INJECTION, SOLUTION INTRAMUSCULAR; INTRAVENOUS; SUBCUTANEOUS at 05:03

## 2025-07-21 ASSESSMENT — PAIN SCALES - GENERAL
PAINLEVEL_OUTOF10: 7
PAINLEVEL_OUTOF10: 6
PAINLEVEL_OUTOF10: 7
PAINLEVEL_OUTOF10: 7
PAINLEVEL_OUTOF10: 10
PAINLEVEL_OUTOF10: 7
PAINLEVEL_OUTOF10: 0
PAINLEVEL_OUTOF10: 0
PAINLEVEL_OUTOF10: 7
PAINLEVEL_OUTOF10: 8
PAINLEVEL_OUTOF10: 7
PAINLEVEL_OUTOF10: 6
PAINLEVEL_OUTOF10: 5

## 2025-07-22 VITALS
OXYGEN SATURATION: 97 % | HEIGHT: 73 IN | WEIGHT: 177.69 LBS | BODY MASS INDEX: 23.55 KG/M2 | HEART RATE: 95 BPM | TEMPERATURE: 98.1 F | RESPIRATION RATE: 18 BRPM | SYSTOLIC BLOOD PRESSURE: 116 MMHG | DIASTOLIC BLOOD PRESSURE: 81 MMHG

## 2025-07-22 LAB
APTT PPP: 28 SEC (ref 22–32)
BASOPHILS # BLD: 0 K/MCL (ref 0–0.3)
BASOPHILS NFR BLD: 1 %
DEPRECATED RDW RBC: 40.8 FL (ref 39–50)
EOSINOPHIL # BLD: 0.1 K/MCL (ref 0–0.5)
EOSINOPHIL NFR BLD: 2 %
ERYTHROCYTE [DISTWIDTH] IN BLOOD: 12.6 % (ref 11–15)
GLUCOSE BLDC GLUCOMTR-MCNC: 310 MG/DL (ref 70–99)
GLUCOSE BLDC GLUCOMTR-MCNC: 323 MG/DL (ref 70–99)
HCT VFR BLD CALC: 41.8 % (ref 39–51)
HGB BLD-MCNC: 14.6 G/DL (ref 13–17)
IMM GRANULOCYTES # BLD AUTO: 0 K/MCL (ref 0–0.2)
IMM GRANULOCYTES # BLD: 1 %
LYMPHOCYTES # BLD: 1.6 K/MCL (ref 1–4.8)
LYMPHOCYTES NFR BLD: 27 %
MAGNESIUM SERPL-MCNC: 2 MG/DL (ref 1.7–2.4)
MCH RBC QN AUTO: 30.9 PG (ref 26–34)
MCHC RBC AUTO-ENTMCNC: 34.9 G/DL (ref 32–36.5)
MCV RBC AUTO: 88.4 FL (ref 78–100)
MONOCYTES # BLD: 0.7 K/MCL (ref 0.3–0.9)
MONOCYTES NFR BLD: 11 %
NEUTROPHILS # BLD: 3.7 K/MCL (ref 1.8–7.7)
NEUTROPHILS NFR BLD: 58 %
NRBC BLD MANUAL-RTO: 0 /100 WBC
PHOSPHATE SERPL-MCNC: 3.4 MG/DL (ref 2.4–4.7)
PLATELET # BLD AUTO: 376 K/MCL (ref 140–450)
RBC # BLD: 4.73 MIL/MCL (ref 4.5–5.9)
WBC # BLD: 6.2 K/MCL (ref 4.2–11)

## 2025-07-22 PROCEDURE — 10002800 HB RX 250 W HCPCS

## 2025-07-22 PROCEDURE — 10004651 HB RX, NO CHARGE ITEM: Performed by: OTOLARYNGOLOGY

## 2025-07-22 PROCEDURE — 83735 ASSAY OF MAGNESIUM: CPT | Performed by: OTOLARYNGOLOGY

## 2025-07-22 PROCEDURE — 10002803 HB RX 637: Performed by: OTOLARYNGOLOGY

## 2025-07-22 PROCEDURE — 36415 COLL VENOUS BLD VENIPUNCTURE: CPT | Performed by: OTOLARYNGOLOGY

## 2025-07-22 PROCEDURE — 10002803 HB RX 637: Performed by: INTERNAL MEDICINE

## 2025-07-22 PROCEDURE — 10002800 HB RX 250 W HCPCS: Performed by: HOSPITALIST

## 2025-07-22 PROCEDURE — 99232 SBSQ HOSP IP/OBS MODERATE 35: CPT | Performed by: HOSPITALIST

## 2025-07-22 PROCEDURE — 10002803 HB RX 637

## 2025-07-22 PROCEDURE — 85730 THROMBOPLASTIN TIME PARTIAL: CPT | Performed by: INTERNAL MEDICINE

## 2025-07-22 PROCEDURE — A4216 STERILE WATER/SALINE, 10 ML: HCPCS | Performed by: OTOLARYNGOLOGY

## 2025-07-22 PROCEDURE — 85025 COMPLETE CBC W/AUTO DIFF WBC: CPT | Performed by: OTOLARYNGOLOGY

## 2025-07-22 PROCEDURE — 84100 ASSAY OF PHOSPHORUS: CPT | Performed by: OTOLARYNGOLOGY

## 2025-07-22 PROCEDURE — 96372 THER/PROPH/DIAG INJ SC/IM: CPT | Performed by: HOSPITALIST

## 2025-07-22 RX ORDER — METHION/INOS/CHOL BT/B COM/LIV 110MG-86MG
100 CAPSULE ORAL DAILY
Qty: 30 TABLET | Refills: 0 | Status: SHIPPED | OUTPATIENT
Start: 2025-07-23

## 2025-07-22 RX ORDER — QUETIAPINE FUMARATE 100 MG/1
100 TABLET, FILM COATED ORAL EVERY 12 HOURS SCHEDULED
Qty: 60 TABLET | Refills: 0 | Status: SHIPPED | OUTPATIENT
Start: 2025-07-22

## 2025-07-22 RX ORDER — LITHIUM CARBONATE 300 MG/1
300 CAPSULE ORAL 2 TIMES DAILY WITH MEALS
Qty: 60 CAPSULE | Refills: 0 | Status: SHIPPED | OUTPATIENT
Start: 2025-07-22

## 2025-07-22 RX ORDER — HYDROCODONE BITARTRATE AND ACETAMINOPHEN 10; 325 MG/1; MG/1
1 TABLET ORAL EVERY 6 HOURS PRN
Qty: 24 TABLET | Refills: 0 | Status: SHIPPED | OUTPATIENT
Start: 2025-07-22

## 2025-07-22 RX ORDER — TRAZODONE HYDROCHLORIDE 100 MG/1
100 TABLET ORAL NIGHTLY PRN
Status: DISCONTINUED | OUTPATIENT
Start: 2025-07-22 | End: 2025-07-22 | Stop reason: HOSPADM

## 2025-07-22 RX ORDER — FOLIC ACID 1 MG/1
1 TABLET ORAL DAILY
Qty: 30 TABLET | Refills: 0 | Status: SHIPPED | OUTPATIENT
Start: 2025-07-23

## 2025-07-22 RX ORDER — METOPROLOL SUCCINATE 100 MG/1
100 TABLET, EXTENDED RELEASE ORAL DAILY
Qty: 30 TABLET | Refills: 0 | Status: SHIPPED | OUTPATIENT
Start: 2025-07-23

## 2025-07-22 RX ADMIN — CHLORHEXIDINE GLUCONATE 15 ML: 1.2 RINSE ORAL at 09:28

## 2025-07-22 RX ADMIN — SODIUM CHLORIDE, PRESERVATIVE FREE 2 ML: 5 INJECTION INTRAVENOUS at 09:31

## 2025-07-22 RX ADMIN — OXYCODONE HYDROCHLORIDE 10 MG: 5 TABLET ORAL at 04:07

## 2025-07-22 RX ADMIN — INSULIN GLARGINE 22 UNITS: 100 INJECTION, SOLUTION SUBCUTANEOUS at 09:28

## 2025-07-22 RX ADMIN — FOLIC ACID 1 MG: 1 TABLET ORAL at 09:28

## 2025-07-22 RX ADMIN — Medication 100 MG: at 09:27

## 2025-07-22 RX ADMIN — METOPROLOL SUCCINATE 100 MG: 50 TABLET, EXTENDED RELEASE ORAL at 09:28

## 2025-07-22 RX ADMIN — HYDROMORPHONE HYDROCHLORIDE 0.5 MG: 1 INJECTION, SOLUTION INTRAMUSCULAR; INTRAVENOUS; SUBCUTANEOUS at 09:31

## 2025-07-22 RX ADMIN — HYDROMORPHONE HYDROCHLORIDE 0.5 MG: 1 INJECTION, SOLUTION INTRAMUSCULAR; INTRAVENOUS; SUBCUTANEOUS at 03:42

## 2025-07-22 RX ADMIN — INSULIN LISPRO 12 UNITS: 100 INJECTION, SOLUTION INTRAVENOUS; SUBCUTANEOUS at 09:28

## 2025-07-22 RX ADMIN — LITHIUM CARBONATE 300 MG: 300 CAPSULE, GELATIN COATED ORAL at 09:27

## 2025-07-22 RX ADMIN — HYDROCODONE BITARTRATE AND ACETAMINOPHEN 1 TABLET: 10; 325 TABLET ORAL at 12:26

## 2025-07-22 RX ADMIN — INSULIN LISPRO 8 UNITS: 100 INJECTION, SOLUTION INTRAVENOUS; SUBCUTANEOUS at 11:12

## 2025-07-22 RX ADMIN — HYDROMORPHONE HYDROCHLORIDE 0.5 MG: 1 INJECTION, SOLUTION INTRAMUSCULAR; INTRAVENOUS; SUBCUTANEOUS at 06:48

## 2025-07-22 RX ADMIN — APIXABAN 10 MG: 5 TABLET, FILM COATED ORAL at 09:28

## 2025-07-22 RX ADMIN — INSULIN LISPRO 12 UNITS: 100 INJECTION, SOLUTION INTRAVENOUS; SUBCUTANEOUS at 11:11

## 2025-07-22 RX ADMIN — QUETIAPINE FUMARATE 100 MG: 100 TABLET, FILM COATED ORAL at 09:27

## 2025-07-22 RX ADMIN — INSULIN LISPRO 8 UNITS: 100 INJECTION, SOLUTION INTRAVENOUS; SUBCUTANEOUS at 09:29

## 2025-07-22 ASSESSMENT — PAIN SCALES - GENERAL
PAINLEVEL_OUTOF10: 0
PAINLEVEL_OUTOF10: 7
PAINLEVEL_OUTOF10: 8
PAINLEVEL_OUTOF10: 0
PAINLEVEL_OUTOF10: 7

## 2025-07-23 LAB
BACTERIA BLD CULT: NORMAL
BACTERIA BLD CULT: NORMAL
GAD65 AB SER IA-ACNC: <5 IU/ML (ref 0–5)

## 2025-07-24 ENCOUNTER — TELEPHONE (OUTPATIENT)
Dept: OTHER | Age: 30
End: 2025-07-24

## 2025-07-24 DIAGNOSIS — Z65.4 VICTIM OF CRIME: Primary | ICD-10-CM

## 2025-07-24 LAB
INSULIN AB SER IA-ACNC: <0.4 U/ML (ref 0–0.4)
ISLET CELL512 AB SER IA-ACNC: 39.5 U/ML (ref 0–7.4)
PANC ISLET CELL AB TITR SER: NORMAL {TITER}
ZNT8 AB SERPL IA-ACNC: 103 U/ML (ref 0–15)

## 2025-07-28 ENCOUNTER — HOSPITAL ENCOUNTER (EMERGENCY)
Facility: HOSPITAL | Age: 30
Discharge: HOME | End: 2025-07-28
Attending: EMERGENCY MEDICINE

## 2025-07-28 VITALS
DIASTOLIC BLOOD PRESSURE: 52 MMHG | OXYGEN SATURATION: 100 % | WEIGHT: 185.19 LBS | TEMPERATURE: 98 F | RESPIRATION RATE: 20 BRPM | HEART RATE: 84 BPM | SYSTOLIC BLOOD PRESSURE: 103 MMHG | BODY MASS INDEX: 24 KG/M2

## 2025-07-28 DIAGNOSIS — Z86.39 HISTORY OF DIABETES MELLITUS: ICD-10-CM

## 2025-07-28 DIAGNOSIS — Z48.89 ENCOUNTER FOR POSTOPERATIVE WOUND CHECK: Primary | ICD-10-CM

## 2025-07-28 LAB — GLUCOSE BLD-MCNC: 211 MG/DL (ref 70–99)

## 2025-07-28 PROCEDURE — 99282 EMERGENCY DEPT VISIT SF MDM: CPT

## 2025-07-28 PROCEDURE — 82962 GLUCOSE BLOOD TEST: CPT

## 2025-07-28 RX ORDER — HYDROCODONE BITARTRATE AND ACETAMINOPHEN 10; 325 MG/1; MG/1
1 TABLET ORAL EVERY 6 HOURS PRN
COMMUNITY

## 2025-07-29 ENCOUNTER — TELEPHONE (OUTPATIENT)
Dept: OTHER | Age: 30
End: 2025-07-29

## 2025-07-29 DIAGNOSIS — Z65.4 VICTIM OF CRIME: Primary | ICD-10-CM

## 2025-08-14 ENCOUNTER — TELEPHONE (OUTPATIENT)
Dept: OTHER | Age: 30
End: 2025-08-14

## 2025-08-14 DIAGNOSIS — Z65.4 VICTIM OF CRIME: Primary | ICD-10-CM

## (undated) DEVICE — GLOVE SURG 6.5 PROTEXIS LF BLUE PF SMTH BEAD CUFF INTLK STRL

## (undated) DEVICE — TOWEL OR BLU 16X26IN 4PK

## (undated) DEVICE — SPONGE SURG 4X4IN CTN 16 PLY XRY DTCT STRL LF DISP

## (undated) DEVICE — GLOVE SURG 6.5 PROTEXIS PI PWDR FREE BEAD CUFF PLISPRN L11.3

## (undated) DEVICE — Device

## (undated) DEVICE — CORD ESURG L12 FT BP FCP FTSWTCH VALLEYLAB

## (undated) DEVICE — SPONGE GAUZE L4 IN X W4 IN 16 PLY WOVEN FOLD EDGE CTN

## (undated) DEVICE — ELECTRODE PT RTN L9 FT VALLEYLAB REM POLYHESIVE ACRYLIC FOAM

## (undated) DEVICE — PACK BTRY VARISPEED STRL LF DISP

## (undated) DEVICE — GLOVE SURG 7 PROTEXIS LF BLUE PF SMTH BEAD CUFF INTLK STRL

## (undated) DEVICE — GLOVE SURG 7.5 PROTEXIS PI PWDR FREE BEAD CUFF PLISPRN L11.8

## (undated) DEVICE — SUTURE VCL+ 3-0 SH 18IN CNTRL RELS BRAID 8 STRN

## (undated) DEVICE — SUTURE PLN FAST ABS GUT 5-0 PC-1 L18 IN MONO YELLOWISH TAN

## (undated) DEVICE — TUBING SCT CLR 12FT 316IN MDVC MAXI-GRIP NCDTV MALE TO MALE

## (undated) DEVICE — SOLUTION SURGSCRB 4OZ PVP IOD ANTIMICROBIAL PLASTIC BTL FOAM

## (undated) DEVICE — ELECTRODE ESURG BLADE PENCIL L10 FT VALLEYLAB EDGE TLSCP